# Patient Record
Sex: FEMALE | Race: WHITE | NOT HISPANIC OR LATINO | Employment: OTHER | URBAN - METROPOLITAN AREA
[De-identification: names, ages, dates, MRNs, and addresses within clinical notes are randomized per-mention and may not be internally consistent; named-entity substitution may affect disease eponyms.]

---

## 2023-04-24 DIAGNOSIS — G25.0 ESSENTIAL TREMOR: Primary | ICD-10-CM

## 2023-04-24 RX ORDER — PRIMIDONE 50 MG/1
50 TABLET ORAL 3 TIMES DAILY
Qty: 30 TABLET | Refills: 2 | Status: SHIPPED | OUTPATIENT
Start: 2023-04-24 | End: 2023-05-19 | Stop reason: SDUPTHER

## 2023-04-24 RX ORDER — PRIMIDONE 50 MG/1
50 TABLET ORAL 3 TIMES DAILY
COMMUNITY
Start: 2020-01-21 | End: 2023-04-24 | Stop reason: SDUPTHER

## 2023-05-16 LAB
ALANINE AMINOTRANSFERASE (SGPT) (U/L) IN SER/PLAS: 19 U/L (ref 7–45)
ALBUMIN (G/DL) IN SER/PLAS: 4.2 G/DL (ref 3.4–5)
ALKALINE PHOSPHATASE (U/L) IN SER/PLAS: 74 U/L (ref 33–136)
ANION GAP IN SER/PLAS: 10 MMOL/L (ref 10–20)
ASPARTATE AMINOTRANSFERASE (SGOT) (U/L) IN SER/PLAS: 17 U/L (ref 9–39)
BILIRUBIN TOTAL (MG/DL) IN SER/PLAS: 0.4 MG/DL (ref 0–1.2)
CALCIUM (MG/DL) IN SER/PLAS: 9.6 MG/DL (ref 8.6–10.3)
CARBON DIOXIDE, TOTAL (MMOL/L) IN SER/PLAS: 31 MMOL/L (ref 21–32)
CHLORIDE (MMOL/L) IN SER/PLAS: 102 MMOL/L (ref 98–107)
CHOLESTEROL (MG/DL) IN SER/PLAS: 175 MG/DL (ref 0–199)
CHOLESTEROL IN HDL (MG/DL) IN SER/PLAS: 72.9 MG/DL
CHOLESTEROL/HDL RATIO: 2.4
COBALAMIN (VITAMIN B12) (PG/ML) IN SER/PLAS: 993 PG/ML (ref 211–911)
CREATININE (MG/DL) IN SER/PLAS: 0.89 MG/DL (ref 0.5–1.05)
ERYTHROCYTE DISTRIBUTION WIDTH (RATIO) BY AUTOMATED COUNT: 13.5 % (ref 11.5–14.5)
ERYTHROCYTE MEAN CORPUSCULAR HEMOGLOBIN CONCENTRATION (G/DL) BY AUTOMATED: 34.5 G/DL (ref 32–36)
ERYTHROCYTE MEAN CORPUSCULAR VOLUME (FL) BY AUTOMATED COUNT: 96 FL (ref 80–100)
ERYTHROCYTES (10*6/UL) IN BLOOD BY AUTOMATED COUNT: 4.86 X10E12/L (ref 4–5.2)
GFR FEMALE: 67 ML/MIN/1.73M2
GLUCOSE (MG/DL) IN SER/PLAS: 121 MG/DL (ref 74–99)
HEMATOCRIT (%) IN BLOOD BY AUTOMATED COUNT: 46.7 % (ref 36–46)
HEMOGLOBIN (G/DL) IN BLOOD: 16.1 G/DL (ref 12–16)
LDL: 92 MG/DL (ref 0–99)
LEUKOCYTES (10*3/UL) IN BLOOD BY AUTOMATED COUNT: 5.1 X10E9/L (ref 4.4–11.3)
PLATELETS (10*3/UL) IN BLOOD AUTOMATED COUNT: 247 X10E9/L (ref 150–450)
POTASSIUM (MMOL/L) IN SER/PLAS: 4 MMOL/L (ref 3.5–5.3)
PROTEIN TOTAL: 7.2 G/DL (ref 6.4–8.2)
SODIUM (MMOL/L) IN SER/PLAS: 139 MMOL/L (ref 136–145)
THYROTROPIN (MIU/L) IN SER/PLAS BY DETECTION LIMIT <= 0.05 MIU/L: 1.1 MIU/L (ref 0.44–3.98)
TRIGLYCERIDE (MG/DL) IN SER/PLAS: 52 MG/DL (ref 0–149)
UREA NITROGEN (MG/DL) IN SER/PLAS: 23 MG/DL (ref 6–23)
VLDL: 10 MG/DL (ref 0–40)

## 2023-05-17 LAB
ESTIMATED AVERAGE GLUCOSE FOR HBA1C: 120 MG/DL
HEMOGLOBIN A1C/HEMOGLOBIN TOTAL IN BLOOD: 5.8 %

## 2023-05-18 ENCOUNTER — TELEPHONE (OUTPATIENT)
Dept: PRIMARY CARE | Facility: CLINIC | Age: 78
End: 2023-05-18
Payer: MEDICARE

## 2023-05-18 NOTE — TELEPHONE ENCOUNTER
Patient notified. Patient states the thyroid medication is making her sick. States Saturday tongue swelled, she called and spoke with Giovani and he told her not to take it. Patient took it again and felt really sick. She has not had her thyroid medication since Monday. Does not want to take it and is not sure what to do. Patient said she knows it the medication as she trailed off of it and has not had any issues

## 2023-05-19 DIAGNOSIS — G25.0 ESSENTIAL TREMOR: ICD-10-CM

## 2023-05-19 RX ORDER — PRIMIDONE 50 MG/1
50 TABLET ORAL 3 TIMES DAILY
Qty: 270 TABLET | Refills: 1 | Status: SHIPPED | OUTPATIENT
Start: 2023-05-19 | End: 2024-02-13 | Stop reason: SDUPTHER

## 2023-06-01 PROBLEM — J18.9 PNEUMONIA: Status: ACTIVE | Noted: 2023-06-01

## 2023-06-01 PROBLEM — G47.33 OSA ON CPAP: Status: ACTIVE | Noted: 2023-06-01

## 2023-06-01 PROBLEM — J45.909 REACTIVE AIRWAY DISEASE (HHS-HCC): Status: ACTIVE | Noted: 2023-06-01

## 2023-06-01 PROBLEM — R73.03 PREDIABETES: Status: ACTIVE | Noted: 2023-06-01

## 2023-06-01 PROBLEM — M79.7 FIBROMYALGIA: Status: ACTIVE | Noted: 2023-06-01

## 2023-06-01 PROBLEM — G25.0 ESSENTIAL TREMOR: Status: ACTIVE | Noted: 2023-06-01

## 2023-06-01 PROBLEM — I10 HTN (HYPERTENSION): Status: ACTIVE | Noted: 2023-06-01

## 2023-06-01 PROBLEM — E03.9 HYPOTHYROIDISM: Status: ACTIVE | Noted: 2023-06-01

## 2023-06-01 PROBLEM — F32.5 MAJOR DEPRESSION IN REMISSION (CMS-HCC): Status: ACTIVE | Noted: 2023-06-01

## 2023-06-01 PROBLEM — R53.1 GENERALIZED WEAKNESS: Status: ACTIVE | Noted: 2023-06-01

## 2023-06-01 PROBLEM — R79.89 LOW VITAMIN D LEVEL: Status: ACTIVE | Noted: 2023-06-01

## 2023-06-01 RX ORDER — LANOLIN ALCOHOL/MO/W.PET/CERES
1 CREAM (GRAM) TOPICAL DAILY
COMMUNITY

## 2023-06-01 RX ORDER — ASCORBIC ACID 500 MG
500 TABLET ORAL DAILY
COMMUNITY

## 2023-06-01 RX ORDER — FUROSEMIDE 20 MG/1
1 TABLET ORAL DAILY
COMMUNITY
Start: 2022-11-23 | End: 2023-12-04 | Stop reason: SINTOL

## 2023-06-01 RX ORDER — BUPROPION HYDROCHLORIDE 300 MG/1
300 TABLET ORAL DAILY
COMMUNITY
End: 2023-07-07 | Stop reason: SDUPTHER

## 2023-06-01 RX ORDER — LEVOTHYROXINE SODIUM 125 UG/1
125 TABLET ORAL DAILY
COMMUNITY
End: 2023-06-02

## 2023-06-01 RX ORDER — ERGOCALCIFEROL (VITAMIN D2) 50 MCG
50 CAPSULE ORAL DAILY
COMMUNITY
Start: 2020-02-24

## 2023-06-01 RX ORDER — MULTIVITAMIN
1 TABLET ORAL DAILY
COMMUNITY

## 2023-06-02 ENCOUNTER — OFFICE VISIT (OUTPATIENT)
Dept: PRIMARY CARE | Facility: CLINIC | Age: 78
End: 2023-06-02
Payer: MEDICARE

## 2023-06-02 VITALS
WEIGHT: 210 LBS | HEART RATE: 80 BPM | SYSTOLIC BLOOD PRESSURE: 130 MMHG | HEIGHT: 65 IN | BODY MASS INDEX: 34.99 KG/M2 | DIASTOLIC BLOOD PRESSURE: 80 MMHG

## 2023-06-02 DIAGNOSIS — Z00.00 MEDICARE ANNUAL WELLNESS VISIT, SUBSEQUENT: ICD-10-CM

## 2023-06-02 DIAGNOSIS — R73.03 PREDIABETES: ICD-10-CM

## 2023-06-02 DIAGNOSIS — M54.2 NECK PAIN: ICD-10-CM

## 2023-06-02 DIAGNOSIS — G47.33 OSA ON CPAP: ICD-10-CM

## 2023-06-02 DIAGNOSIS — J45.909 REACTIVE AIRWAY DISEASE WITHOUT COMPLICATION, UNSPECIFIED ASTHMA SEVERITY, UNSPECIFIED WHETHER PERSISTENT (HHS-HCC): ICD-10-CM

## 2023-06-02 DIAGNOSIS — R73.9 HYPERGLYCEMIA: ICD-10-CM

## 2023-06-02 DIAGNOSIS — G25.0 ESSENTIAL TREMOR: ICD-10-CM

## 2023-06-02 DIAGNOSIS — R79.89 LOW VITAMIN D LEVEL: ICD-10-CM

## 2023-06-02 DIAGNOSIS — E55.9 VITAMIN D DEFICIENCY: ICD-10-CM

## 2023-06-02 DIAGNOSIS — F32.5 MAJOR DEPRESSION IN REMISSION (CMS-HCC): ICD-10-CM

## 2023-06-02 DIAGNOSIS — I10 PRIMARY HYPERTENSION: Primary | ICD-10-CM

## 2023-06-02 DIAGNOSIS — E03.9 ACQUIRED HYPOTHYROIDISM: ICD-10-CM

## 2023-06-02 PROCEDURE — 3079F DIAST BP 80-89 MM HG: CPT | Performed by: CLINICAL NURSE SPECIALIST

## 2023-06-02 PROCEDURE — 3075F SYST BP GE 130 - 139MM HG: CPT | Performed by: CLINICAL NURSE SPECIALIST

## 2023-06-02 PROCEDURE — 1036F TOBACCO NON-USER: CPT | Performed by: CLINICAL NURSE SPECIALIST

## 2023-06-02 PROCEDURE — 1159F MED LIST DOCD IN RCRD: CPT | Performed by: CLINICAL NURSE SPECIALIST

## 2023-06-02 PROCEDURE — G0439 PPPS, SUBSEQ VISIT: HCPCS | Performed by: CLINICAL NURSE SPECIALIST

## 2023-06-02 PROCEDURE — G0444 DEPRESSION SCREEN ANNUAL: HCPCS | Performed by: CLINICAL NURSE SPECIALIST

## 2023-06-02 PROCEDURE — 99214 OFFICE O/P EST MOD 30 MIN: CPT | Performed by: CLINICAL NURSE SPECIALIST

## 2023-06-02 PROCEDURE — 1170F FXNL STATUS ASSESSED: CPT | Performed by: CLINICAL NURSE SPECIALIST

## 2023-06-02 PROCEDURE — 1160F RVW MEDS BY RX/DR IN RCRD: CPT | Performed by: CLINICAL NURSE SPECIALIST

## 2023-06-02 RX ORDER — BLOOD-GLUCOSE METER
1 KIT MISCELLANEOUS DAILY
Qty: 100 STRIP | Refills: 11 | Status: SHIPPED | OUTPATIENT
Start: 2023-06-02 | End: 2023-06-05 | Stop reason: SDUPTHER

## 2023-06-02 RX ORDER — LANCETS 28 GAUGE
EACH MISCELLANEOUS
Qty: 100 EACH | Refills: 11 | Status: SHIPPED | OUTPATIENT
Start: 2023-06-02 | End: 2023-06-05 | Stop reason: SDUPTHER

## 2023-06-02 RX ORDER — LEVOTHYROXINE SODIUM 125 UG/1
125 TABLET ORAL DAILY
Qty: 30 TABLET | Refills: 11 | Status: SHIPPED | OUTPATIENT
Start: 2023-06-02 | End: 2023-07-07 | Stop reason: SDUPTHER

## 2023-06-02 RX ORDER — INSULIN PUMP SYRINGE, 3 ML
1 EACH MISCELLANEOUS DAILY
Qty: 1 EACH | Refills: 0 | Status: SHIPPED | OUTPATIENT
Start: 2023-06-02 | End: 2023-06-05 | Stop reason: SDUPTHER

## 2023-06-02 ASSESSMENT — ENCOUNTER SYMPTOMS
APPETITE CHANGE: 0
NECK PAIN: 0
SLEEP DISTURBANCE: 0
HEMATURIA: 0
WOUND: 0
ACTIVITY CHANGE: 0
SHORTNESS OF BREATH: 0
LOSS OF SENSATION IN FEET: 0
CHILLS: 0
CONSTIPATION: 0
FATIGUE: 0
ABDOMINAL PAIN: 0
PHOTOPHOBIA: 0
BLOOD IN STOOL: 0
NAUSEA: 0
DEPRESSION: 1
BACK PAIN: 1
CONFUSION: 0
SEIZURES: 0
DYSURIA: 0
EYE PAIN: 0
OCCASIONAL FEELINGS OF UNSTEADINESS: 0
WHEEZING: 0
POLYDIPSIA: 0
SORE THROAT: 0
MYALGIAS: 0
ARTHRALGIAS: 1
JOINT SWELLING: 0
DIZZINESS: 0
VOMITING: 0
BRUISES/BLEEDS EASILY: 0
UNEXPECTED WEIGHT CHANGE: 0
PALPITATIONS: 0
FEVER: 0
HEADACHES: 0
COUGH: 0
DIARRHEA: 0
CHEST TIGHTNESS: 0
FLANK PAIN: 0
TROUBLE SWALLOWING: 0

## 2023-06-02 ASSESSMENT — PATIENT HEALTH QUESTIONNAIRE - PHQ9
SUM OF ALL RESPONSES TO PHQ9 QUESTIONS 1 AND 2: 1
1. LITTLE INTEREST OR PLEASURE IN DOING THINGS: NOT AT ALL
2. FEELING DOWN, DEPRESSED OR HOPELESS: SEVERAL DAYS

## 2023-06-02 ASSESSMENT — ACTIVITIES OF DAILY LIVING (ADL)
GROCERY_SHOPPING: INDEPENDENT
TAKING_MEDICATION: INDEPENDENT
DOING_HOUSEWORK: INDEPENDENT
BATHING: INDEPENDENT
DRESSING: INDEPENDENT
MANAGING_FINANCES: INDEPENDENT

## 2023-06-02 ASSESSMENT — COLUMBIA-SUICIDE SEVERITY RATING SCALE - C-SSRS
2. HAVE YOU ACTUALLY HAD ANY THOUGHTS OF KILLING YOURSELF?: NO
1. IN THE PAST MONTH, HAVE YOU WISHED YOU WERE DEAD OR WISHED YOU COULD GO TO SLEEP AND NOT WAKE UP?: NO
6. HAVE YOU EVER DONE ANYTHING, STARTED TO DO ANYTHING, OR PREPARED TO DO ANYTHING TO END YOUR LIFE?: NO

## 2023-06-02 NOTE — PROGRESS NOTES
Subjective   Reason for Visit: Liane Gates is an 77 y.o. female here for a Medicare Wellness visit.     Past Medical, Surgical, and Family History reviewed and updated in chart.    Reviewed all medications by prescribing practitioner or clinical pharmacist (such as prescriptions, OTCs, herbal therapies and supplements) and documented in the medical record.    HPI    Here today as a follow up appointment. Due for Medicare Wellness.     Diagnosed with COVID in December 2021. Struggling with continued complaints of symptoms, some improvement.     She does have a history of reactive airway disease and has had recurrent bronchospasm after pulmonary infections in the past. Previously responded well to steroid inhaler, she has stopped taking it daily secondary to cost. She does have one on hand in case she gets an illness currently not having any shortness of breath or wheezing.     She does have history of depression but notes that symptoms have been well controlled with Wellbutrin. Essential tremor symptoms have been controlled with medication.She does not feel that she needs to increase or change medication.     Essential hypertension. Does not tolerate Spironolactone. Developed a rash on medication. Stopped medication and symptoms have started to resolve. Multiple medication allergies for blood pressure medications. Previously was on Hydrochlorothiazide, no longer tolerated and again stopped medication. Has been wearing her compression stockings.     Trouble tolerating her Levothyroxine. Developed side effects. Called into the office and was recommended to stop medication from on call. Attempted to restart and symptoms returned.     Complaints of increased neck pain and discomfort. Has been doing at home exercises to try and find relief.        Patient Care Team:  ROBERT Ba as PCP - General (Internal Medicine)  ROBERT Ba as PCP - [facility] Attributed Provider     Review of Systems  "  Constitutional:  Negative for activity change, appetite change, chills, fatigue, fever and unexpected weight change.   HENT:  Negative for ear pain, hearing loss, nosebleeds, sore throat, tinnitus and trouble swallowing.    Eyes:  Negative for photophobia, pain and visual disturbance.   Respiratory:  Negative for cough, chest tightness, shortness of breath and wheezing.    Cardiovascular:  Negative for chest pain, palpitations and leg swelling.   Gastrointestinal:  Negative for abdominal pain, blood in stool, constipation, diarrhea, nausea and vomiting.   Endocrine: Negative for cold intolerance, heat intolerance, polydipsia and polyuria.   Genitourinary:  Negative for dysuria, flank pain and hematuria.   Musculoskeletal:  Positive for arthralgias and back pain. Negative for joint swelling, myalgias and neck pain.   Skin:  Negative for pallor, rash and wound.   Allergic/Immunologic: Negative for immunocompromised state.   Neurological:  Negative for dizziness, seizures and headaches.   Hematological:  Does not bruise/bleed easily.   Psychiatric/Behavioral:  Negative for confusion and sleep disturbance.        Objective   Vitals:  /90 (BP Location: Right arm, Patient Position: Sitting)   Pulse 80   Ht 1.638 m (5' 4.5\")   Wt 95.3 kg (210 lb)   BMI 35.49 kg/m²       Physical Exam  Vitals and nursing note reviewed.   Constitutional:       General: She is not in acute distress.     Appearance: Normal appearance.   HENT:      Head: Normocephalic.      Nose: Nose normal.   Eyes:      Conjunctiva/sclera: Conjunctivae normal.   Neck:      Vascular: No carotid bruit.   Cardiovascular:      Rate and Rhythm: Normal rate and regular rhythm.      Pulses: Normal pulses.      Heart sounds: Normal heart sounds.   Pulmonary:      Effort: Pulmonary effort is normal.      Breath sounds: Normal breath sounds.   Abdominal:      General: Bowel sounds are normal.      Palpations: Abdomen is soft.   Musculoskeletal:         " General: Normal range of motion.      Cervical back: Normal range of motion.   Skin:     General: Skin is warm and dry.   Neurological:      Mental Status: She is alert and oriented to person, place, and time. Mental status is at baseline.   Psychiatric:         Mood and Affect: Mood normal.         Behavior: Behavior normal.         Assessment/Plan   Problem List Items Addressed This Visit    None    Reviewed most recent lab work available with patient.     Obstructive sleep apnea. Last CPAP titration study was done June 2019. Patient doing well on CPAP at night with pressure of 11.   Hypothyroidism. Stopped Levothyroxine, did not tolerate. Synthroid. Repeat studies ordered.   Prediabetes. A1C 5.8%. Encouraged to be more consistent with diet. Previously did not tolerate metformin secondary to side effects. We'll continue to monitor hemoglobin A1c in 6 months. Supplies ordered to monitor Glucose.   Reactive airway disease. She has had problems with recurrent bronchospasm after pulmonary infections. Responded previously to Asmanex inhaler but stopped taking daily secondary to cost currently asymptomatic. pulmonary function test in May 2019 was normal.  Obesity: BMI: 35.49. Lifestyle changes recommended: Diet consisting of low fat foods, lean meats, high fiber, fresh fruits and vegetables. 150 min/ weekly aerobic exercise.  Essential tremor. Symptomatically controlled with primidone.  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  Hypertension. Does not tolerate multiple Hypertension medications. Discontinued Spironolactone and Furosemide. Restarted HCTZ, did not tolerate. Screening coronary artery calcium score was 2.4 in May 2019. Follow up with blood pressure readings.   Medicare Wellness: Routine and age appropriate recommendations discussed with the patient today and patient verbalized understanding of the recommendations.  Questions answered.  Age appropriate immunizations and preventative  screenings discussed with the patient and ordered as appropriate. Labs updated and ordered as indicated. Recommend healthy diet and daily exercise to maintain healthy body weight.   Neck Pain: Imaging ordered. Will follow up pending results.     DEXA scan was done May 2019. Scheduled for June 2023.   Mammogram : October 2020. Scheduled for June 2023.   Cologuard was negative November 2020, repeat in 3 years.   Prevnar done November 2017  Pneumovax done in May 2018.   Flu Vaccine: November 2022.   Medicare Wellness: May 2023.  COVID Vaccine: April/June 2021. November 2022.

## 2023-06-05 DIAGNOSIS — R73.9 HYPERGLYCEMIA: ICD-10-CM

## 2023-06-05 DIAGNOSIS — R73.03 PREDIABETES: ICD-10-CM

## 2023-06-05 RX ORDER — LANCETS 28 GAUGE
EACH MISCELLANEOUS
Qty: 100 EACH | Refills: 11 | Status: SHIPPED | OUTPATIENT
Start: 2023-06-05 | End: 2024-02-08 | Stop reason: SDUPTHER

## 2023-06-05 RX ORDER — BLOOD-GLUCOSE METER
1 KIT MISCELLANEOUS DAILY
Qty: 100 STRIP | Refills: 11 | Status: SHIPPED | OUTPATIENT
Start: 2023-06-05 | End: 2024-02-08 | Stop reason: SDUPTHER

## 2023-06-05 RX ORDER — INSULIN PUMP SYRINGE, 3 ML
1 EACH MISCELLANEOUS DAILY
Qty: 1 EACH | Refills: 0 | Status: SHIPPED | OUTPATIENT
Start: 2023-06-05 | End: 2024-02-08 | Stop reason: SDUPTHER

## 2023-06-06 ENCOUNTER — TELEPHONE (OUTPATIENT)
Dept: PRIMARY CARE | Facility: CLINIC | Age: 78
End: 2023-06-06
Payer: MEDICARE

## 2023-06-06 NOTE — TELEPHONE ENCOUNTER
Left on voicemail:  Meter and Lancets not covered Medicare Part D with Dx codes, please resend with different Dx code  [facility]

## 2023-07-07 ENCOUNTER — TELEPHONE (OUTPATIENT)
Dept: PRIMARY CARE | Facility: CLINIC | Age: 78
End: 2023-07-07
Payer: MEDICARE

## 2023-07-07 DIAGNOSIS — F32.5 MAJOR DEPRESSION IN REMISSION (CMS-HCC): ICD-10-CM

## 2023-07-07 DIAGNOSIS — E03.9 ACQUIRED HYPOTHYROIDISM: ICD-10-CM

## 2023-07-07 RX ORDER — BUPROPION HYDROCHLORIDE 300 MG/1
300 TABLET ORAL DAILY
Qty: 90 TABLET | Refills: 0 | Status: SHIPPED | OUTPATIENT
Start: 2023-07-07 | End: 2023-10-02 | Stop reason: SDUPTHER

## 2023-07-07 RX ORDER — LEVOTHYROXINE SODIUM 125 UG/1
125 TABLET ORAL DAILY
Qty: 90 TABLET | Refills: 0 | Status: SHIPPED | OUTPATIENT
Start: 2023-07-07 | End: 2023-07-10

## 2023-07-07 NOTE — TELEPHONE ENCOUNTER
Rx Refill Request Telephone Encounter    Name:  Liane Gates  :  [id number]  Medication Name:  Synthroid  125mcg          Specific Pharmacy location:  [facility]  Rx Refill Request Telephone Encounter    Name:  Liane Gates  :  [id number]  Medication Name:  Bupropion  300mg

## 2023-07-10 ENCOUNTER — TELEPHONE (OUTPATIENT)
Dept: PRIMARY CARE | Facility: CLINIC | Age: 78
End: 2023-07-10
Payer: MEDICARE

## 2023-07-10 DIAGNOSIS — E03.9 ACQUIRED HYPOTHYROIDISM: Primary | ICD-10-CM

## 2023-07-10 RX ORDER — THYROID 30 MG/1
30 TABLET ORAL
Qty: 30 TABLET | Refills: 1 | Status: SHIPPED | OUTPATIENT
Start: 2023-07-10 | End: 2023-12-04 | Stop reason: SINTOL

## 2023-07-10 NOTE — PROGRESS NOTES
Medication E-Prescribed. Please order repeat Thyroid studies in 3 months and make sure follow up appointment scheduled. Thank you!

## 2023-07-10 NOTE — TELEPHONE ENCOUNTER
If still unable to tolerate, would recommend that we have her see Endo to see what other options are available.

## 2023-07-10 NOTE — TELEPHONE ENCOUNTER
Not sure if the other message sent--     E-prescribed. Make sure that she we repeat Thyroid studies in 3 months and has a follow up appointment. Thank you!

## 2023-07-10 NOTE — TELEPHONE ENCOUNTER
Patient was started on Synthroid due to an allergy from Levothyroxine. Patient stated mid week last week she had a swollen lip and had to take Benadryl and had an episode of a rapid heart beat that lasted 2-3 min. Then over the weekend she had a huge blotch on her forearm that was red, swollen and warm to touch. She took Benadryl for that as well and it has subsided.

## 2023-08-21 ENCOUNTER — TELEPHONE (OUTPATIENT)
Dept: PRIMARY CARE | Facility: CLINIC | Age: 78
End: 2023-08-21
Payer: MEDICARE

## 2023-08-21 DIAGNOSIS — E03.9 ACQUIRED HYPOTHYROIDISM: ICD-10-CM

## 2023-08-21 NOTE — TELEPHONE ENCOUNTER
Patient stopped taking the [place] thyroid meds. Was having side effects:redness and swelling in palm and warm to touch,blurred vision,swelling in legs, bad heat intolerance. Would like to try another medication. [facility]

## 2023-08-21 NOTE — TELEPHONE ENCOUNTER
We have tried three different medications, Levothyroxine, Synthroid, and now [place] Thyroid. If she does not tolerate any of these medications we need to have her see Endocrinology. Please place a referral. Thank you!

## 2023-08-22 ENCOUNTER — TELEPHONE (OUTPATIENT)
Dept: PRIMARY CARE | Facility: CLINIC | Age: 78
End: 2023-08-22
Payer: MEDICARE

## 2023-08-22 NOTE — TELEPHONE ENCOUNTER
Patient needs the referral for endocrinology, last office note and blood work faxed to [facility] at [phone]. She couldn't get into Dr. Sainz until February of next year

## 2023-09-05 ENCOUNTER — TELEPHONE (OUTPATIENT)
Dept: PRIMARY CARE | Facility: CLINIC | Age: 78
End: 2023-09-05
Payer: MEDICARE

## 2023-09-05 NOTE — TELEPHONE ENCOUNTER
Patient called for mammogram results and cervical spine   Patient  will see Endocrinologist in February and wondering how her thyroid can be monitored until then does she need blood work ?etc.  Please call [phone]

## 2023-09-05 NOTE — TELEPHONE ENCOUNTER
Please let patient know that Mammogram was negative.   XR imaging indicates chronic degenerative changes, consistent with arthritis.   We can monitor her Thyroid but she has not tolerated any medications that we have tried. Another option would be to check with any other Endocrinologist but may need to drive further. There is already an updated Thyroid panel in the system to be evaluated.

## 2023-10-02 ENCOUNTER — TELEPHONE (OUTPATIENT)
Dept: PRIMARY CARE | Facility: CLINIC | Age: 78
End: 2023-10-02
Payer: MEDICARE

## 2023-10-02 DIAGNOSIS — F32.5 MAJOR DEPRESSION IN REMISSION (CMS-HCC): ICD-10-CM

## 2023-10-02 RX ORDER — BUPROPION HYDROCHLORIDE 300 MG/1
300 TABLET ORAL DAILY
Qty: 90 TABLET | Refills: 0 | Status: SHIPPED | OUTPATIENT
Start: 2023-10-02 | End: 2024-01-04 | Stop reason: SDUPTHER

## 2023-10-04 ENCOUNTER — TELEPHONE (OUTPATIENT)
Dept: PRIMARY CARE | Facility: CLINIC | Age: 78
End: 2023-10-04
Payer: MEDICARE

## 2023-10-04 NOTE — TELEPHONE ENCOUNTER
Potentially. Can repeat her Thyroid panel and see if level is worse. Otherwise, should be seen for an acute to see if there are other concerns.

## 2023-10-04 NOTE — TELEPHONE ENCOUNTER
She has been off thyroid medication for 6 weeks    She has had blurred vision for past week & her tremors are worse    Is this from being off thyroid medications ?

## 2023-10-10 ENCOUNTER — APPOINTMENT (OUTPATIENT)
Dept: RADIOLOGY | Facility: CLINIC | Age: 78
End: 2023-10-10
Payer: MEDICARE

## 2023-10-25 ENCOUNTER — TELEPHONE (OUTPATIENT)
Dept: PRIMARY CARE | Facility: CLINIC | Age: 78
End: 2023-10-25
Payer: MEDICARE

## 2023-10-25 DIAGNOSIS — R73.03 PREDIABETES: Primary | ICD-10-CM

## 2023-10-25 NOTE — TELEPHONE ENCOUNTER
Patient is going tomorrow to have her TSH checked she is asking if she can have a fasting blood work to check her sugars? She stated she hasn't been feeling well and she's been having issues with her vision being blurred. She is scheduled to see her eye doctor soon.

## 2023-10-30 ENCOUNTER — TELEPHONE (OUTPATIENT)
Dept: PRIMARY CARE | Facility: CLINIC | Age: 78
End: 2023-10-30
Payer: MEDICARE

## 2023-10-30 ENCOUNTER — LAB (OUTPATIENT)
Dept: LAB | Facility: LAB | Age: 78
End: 2023-10-30
Payer: MEDICARE

## 2023-10-30 DIAGNOSIS — Z78.0 POSTMENOPAUSAL: Primary | ICD-10-CM

## 2023-10-30 DIAGNOSIS — F32.5 MAJOR DEPRESSION IN REMISSION (CMS-HCC): ICD-10-CM

## 2023-10-30 DIAGNOSIS — R79.89 LOW VITAMIN D LEVEL: ICD-10-CM

## 2023-10-30 DIAGNOSIS — E55.9 VITAMIN D DEFICIENCY: ICD-10-CM

## 2023-10-30 DIAGNOSIS — G47.33 OSA ON CPAP: ICD-10-CM

## 2023-10-30 DIAGNOSIS — G25.0 ESSENTIAL TREMOR: ICD-10-CM

## 2023-10-30 DIAGNOSIS — J45.909 REACTIVE AIRWAY DISEASE WITHOUT COMPLICATION, UNSPECIFIED ASTHMA SEVERITY, UNSPECIFIED WHETHER PERSISTENT (HHS-HCC): ICD-10-CM

## 2023-10-30 DIAGNOSIS — E03.9 ACQUIRED HYPOTHYROIDISM: ICD-10-CM

## 2023-10-30 DIAGNOSIS — R73.03 PREDIABETES: ICD-10-CM

## 2023-10-30 DIAGNOSIS — I10 PRIMARY HYPERTENSION: ICD-10-CM

## 2023-10-30 DIAGNOSIS — Z00.00 MEDICARE ANNUAL WELLNESS VISIT, SUBSEQUENT: ICD-10-CM

## 2023-10-30 LAB
25(OH)D3 SERPL-MCNC: 78 NG/ML (ref 30–100)
ALBUMIN SERPL BCP-MCNC: 4.1 G/DL (ref 3.4–5)
ALP SERPL-CCNC: 62 U/L (ref 33–136)
ALT SERPL W P-5'-P-CCNC: 19 U/L (ref 7–45)
ANION GAP SERPL CALC-SCNC: 10 MMOL/L (ref 10–20)
AST SERPL W P-5'-P-CCNC: 18 U/L (ref 9–39)
BILIRUB SERPL-MCNC: 0.5 MG/DL (ref 0–1.2)
BUN SERPL-MCNC: 22 MG/DL (ref 6–23)
CALCIUM SERPL-MCNC: 9.5 MG/DL (ref 8.6–10.3)
CHLORIDE SERPL-SCNC: 103 MMOL/L (ref 98–107)
CO2 SERPL-SCNC: 32 MMOL/L (ref 21–32)
CREAT SERPL-MCNC: 0.85 MG/DL (ref 0.5–1.05)
GFR SERPL CREATININE-BSD FRML MDRD: 71 ML/MIN/1.73M*2
GLUCOSE SERPL-MCNC: 122 MG/DL (ref 74–99)
POTASSIUM SERPL-SCNC: 4.2 MMOL/L (ref 3.5–5.3)
PROT SERPL-MCNC: 6.3 G/DL (ref 6.4–8.2)
SODIUM SERPL-SCNC: 141 MMOL/L (ref 136–145)
T4 FREE SERPL-MCNC: 0.75 NG/DL (ref 0.61–1.12)
TSH SERPL-ACNC: 4.42 MIU/L (ref 0.44–3.98)

## 2023-10-30 PROCEDURE — 36415 COLL VENOUS BLD VENIPUNCTURE: CPT

## 2023-10-30 PROCEDURE — 80053 COMPREHEN METABOLIC PANEL: CPT

## 2023-10-30 PROCEDURE — 84443 ASSAY THYROID STIM HORMONE: CPT

## 2023-10-30 PROCEDURE — 82306 VITAMIN D 25 HYDROXY: CPT

## 2023-10-30 PROCEDURE — 83036 HEMOGLOBIN GLYCOSYLATED A1C: CPT

## 2023-10-30 PROCEDURE — 84439 ASSAY OF FREE THYROXINE: CPT

## 2023-10-30 NOTE — TELEPHONE ENCOUNTER
Scheduled for bone density on 11/2    Order is system is for extra views which cannot be done in [place].  Is order supposed to be for the normal bone density  ?      Alex ([facility]) [phone]

## 2023-10-31 LAB
EST. AVERAGE GLUCOSE BLD GHB EST-MCNC: 123 MG/DL
HBA1C MFR BLD: 5.9 %

## 2023-11-01 ENCOUNTER — TELEPHONE (OUTPATIENT)
Dept: PRIMARY CARE | Facility: CLINIC | Age: 78
End: 2023-11-01
Payer: MEDICARE

## 2023-11-01 NOTE — TELEPHONE ENCOUNTER
Symptoms include tiredness, depression, cold intolerances, weight gain, constipation, muscle cramps/weakness.

## 2023-11-01 NOTE — TELEPHONE ENCOUNTER
Patient notified. Patient states they have her on a high priority list to be seen sooner. Patient did call around and was able to be seen in [place] but want to stay in with  and does not want to drive far.  Patient wants to know what signs she should look for. States she just feels crappy all the way around

## 2023-11-01 NOTE — TELEPHONE ENCOUNTER
----- Message from MACK Ba-CNS sent at 10/31/2023  9:33 PM EDT -----  Please call patient with lab results. Lab work is stable except that her Thyroid is worse again. Has not been able to tolerate any of the medications for her Thyroid that we have prescribed. Does have a follow up with Endo, not until February. Can check with her Insurance any other Endo that might be able to see her sooner.

## 2023-11-02 ENCOUNTER — APPOINTMENT (OUTPATIENT)
Dept: RADIOLOGY | Facility: CLINIC | Age: 78
End: 2023-11-02
Payer: MEDICARE

## 2023-11-22 ENCOUNTER — APPOINTMENT (OUTPATIENT)
Dept: RADIOLOGY | Facility: CLINIC | Age: 78
End: 2023-11-22
Payer: MEDICARE

## 2023-11-30 ENCOUNTER — LAB (OUTPATIENT)
Dept: LAB | Facility: LAB | Age: 78
End: 2023-11-30
Payer: MEDICARE

## 2023-11-30 DIAGNOSIS — E03.9 ACQUIRED HYPOTHYROIDISM: ICD-10-CM

## 2023-11-30 DIAGNOSIS — I10 PRIMARY HYPERTENSION: ICD-10-CM

## 2023-11-30 DIAGNOSIS — G47.33 OSA ON CPAP: ICD-10-CM

## 2023-11-30 DIAGNOSIS — J45.909 REACTIVE AIRWAY DISEASE WITHOUT COMPLICATION, UNSPECIFIED ASTHMA SEVERITY, UNSPECIFIED WHETHER PERSISTENT (HHS-HCC): ICD-10-CM

## 2023-11-30 DIAGNOSIS — F32.5 MAJOR DEPRESSION IN REMISSION (CMS-HCC): ICD-10-CM

## 2023-11-30 DIAGNOSIS — R73.03 PREDIABETES: ICD-10-CM

## 2023-11-30 DIAGNOSIS — Z00.00 MEDICARE ANNUAL WELLNESS VISIT, SUBSEQUENT: ICD-10-CM

## 2023-11-30 DIAGNOSIS — G25.0 ESSENTIAL TREMOR: ICD-10-CM

## 2023-11-30 LAB
ALBUMIN SERPL BCP-MCNC: 4.4 G/DL (ref 3.4–5)
ALP SERPL-CCNC: 70 U/L (ref 33–136)
ALT SERPL W P-5'-P-CCNC: 18 U/L (ref 7–45)
ANION GAP SERPL CALC-SCNC: 10 MMOL/L (ref 10–20)
AST SERPL W P-5'-P-CCNC: 17 U/L (ref 9–39)
BILIRUB SERPL-MCNC: 0.5 MG/DL (ref 0–1.2)
BUN SERPL-MCNC: 26 MG/DL (ref 6–23)
CALCIUM SERPL-MCNC: 9.8 MG/DL (ref 8.6–10.3)
CHLORIDE SERPL-SCNC: 101 MMOL/L (ref 98–107)
CHOLEST SERPL-MCNC: 194 MG/DL (ref 0–199)
CHOLESTEROL/HDL RATIO: 2.3
CO2 SERPL-SCNC: 33 MMOL/L (ref 21–32)
CREAT SERPL-MCNC: 0.85 MG/DL (ref 0.5–1.05)
ERYTHROCYTE [DISTWIDTH] IN BLOOD BY AUTOMATED COUNT: 13.8 % (ref 11.5–14.5)
GFR SERPL CREATININE-BSD FRML MDRD: 71 ML/MIN/1.73M*2
GLUCOSE SERPL-MCNC: 119 MG/DL (ref 74–99)
HCT VFR BLD AUTO: 47 % (ref 36–46)
HDLC SERPL-MCNC: 83.7 MG/DL
HGB BLD-MCNC: 15.6 G/DL (ref 12–16)
LDLC SERPL CALC-MCNC: 101 MG/DL
MCH RBC QN AUTO: 32.6 PG (ref 26–34)
MCHC RBC AUTO-ENTMCNC: 33.2 G/DL (ref 32–36)
MCV RBC AUTO: 98 FL (ref 80–100)
NON HDL CHOLESTEROL: 110 MG/DL (ref 0–149)
NRBC BLD-RTO: 0 /100 WBCS (ref 0–0)
PLATELET # BLD AUTO: 225 X10*3/UL (ref 150–450)
POTASSIUM SERPL-SCNC: 4.3 MMOL/L (ref 3.5–5.3)
PROT SERPL-MCNC: 7.2 G/DL (ref 6.4–8.2)
RBC # BLD AUTO: 4.79 X10*6/UL (ref 4–5.2)
SODIUM SERPL-SCNC: 140 MMOL/L (ref 136–145)
TRIGL SERPL-MCNC: 49 MG/DL (ref 0–149)
TSH SERPL-ACNC: 3.97 MIU/L (ref 0.44–3.98)
VIT B12 SERPL-MCNC: 929 PG/ML (ref 211–911)
VLDL: 10 MG/DL (ref 0–40)
WBC # BLD AUTO: 6.1 X10*3/UL (ref 4.4–11.3)

## 2023-11-30 PROCEDURE — 82607 VITAMIN B-12: CPT

## 2023-11-30 PROCEDURE — 83036 HEMOGLOBIN GLYCOSYLATED A1C: CPT

## 2023-11-30 PROCEDURE — 85027 COMPLETE CBC AUTOMATED: CPT

## 2023-11-30 PROCEDURE — 80053 COMPREHEN METABOLIC PANEL: CPT

## 2023-11-30 PROCEDURE — 84443 ASSAY THYROID STIM HORMONE: CPT

## 2023-11-30 PROCEDURE — 36415 COLL VENOUS BLD VENIPUNCTURE: CPT

## 2023-11-30 PROCEDURE — 80061 LIPID PANEL: CPT

## 2023-12-01 LAB
EST. AVERAGE GLUCOSE BLD GHB EST-MCNC: 131 MG/DL
HBA1C MFR BLD: 6.2 %

## 2023-12-04 ENCOUNTER — OFFICE VISIT (OUTPATIENT)
Dept: PRIMARY CARE | Facility: CLINIC | Age: 78
End: 2023-12-04
Payer: MEDICARE

## 2023-12-04 VITALS
WEIGHT: 205 LBS | SYSTOLIC BLOOD PRESSURE: 132 MMHG | HEIGHT: 65 IN | HEART RATE: 68 BPM | BODY MASS INDEX: 34.16 KG/M2 | DIASTOLIC BLOOD PRESSURE: 80 MMHG

## 2023-12-04 DIAGNOSIS — G25.0 ESSENTIAL TREMOR: ICD-10-CM

## 2023-12-04 DIAGNOSIS — I10 PRIMARY HYPERTENSION: ICD-10-CM

## 2023-12-04 DIAGNOSIS — M19.90 OSTEOARTHRITIS, UNSPECIFIED OSTEOARTHRITIS TYPE, UNSPECIFIED SITE: Primary | ICD-10-CM

## 2023-12-04 DIAGNOSIS — Z12.11 COLON CANCER SCREENING: ICD-10-CM

## 2023-12-04 DIAGNOSIS — R73.03 PREDIABETES: ICD-10-CM

## 2023-12-04 DIAGNOSIS — R79.89 LOW VITAMIN D LEVEL: ICD-10-CM

## 2023-12-04 DIAGNOSIS — J45.909 REACTIVE AIRWAY DISEASE WITHOUT COMPLICATION, UNSPECIFIED ASTHMA SEVERITY, UNSPECIFIED WHETHER PERSISTENT (HHS-HCC): ICD-10-CM

## 2023-12-04 DIAGNOSIS — G47.33 OSA ON CPAP: ICD-10-CM

## 2023-12-04 DIAGNOSIS — E03.9 ACQUIRED HYPOTHYROIDISM: ICD-10-CM

## 2023-12-04 DIAGNOSIS — F32.5 MAJOR DEPRESSION IN REMISSION (CMS-HCC): ICD-10-CM

## 2023-12-04 DIAGNOSIS — E55.9 VITAMIN D DEFICIENCY: ICD-10-CM

## 2023-12-04 DIAGNOSIS — G31.84 MCI (MILD COGNITIVE IMPAIRMENT): ICD-10-CM

## 2023-12-04 PROCEDURE — 1036F TOBACCO NON-USER: CPT | Performed by: CLINICAL NURSE SPECIALIST

## 2023-12-04 PROCEDURE — G0008 ADMIN INFLUENZA VIRUS VAC: HCPCS | Performed by: CLINICAL NURSE SPECIALIST

## 2023-12-04 PROCEDURE — 1160F RVW MEDS BY RX/DR IN RCRD: CPT | Performed by: CLINICAL NURSE SPECIALIST

## 2023-12-04 PROCEDURE — 90662 IIV NO PRSV INCREASED AG IM: CPT | Performed by: CLINICAL NURSE SPECIALIST

## 2023-12-04 PROCEDURE — 3079F DIAST BP 80-89 MM HG: CPT | Performed by: CLINICAL NURSE SPECIALIST

## 2023-12-04 PROCEDURE — 99214 OFFICE O/P EST MOD 30 MIN: CPT | Performed by: CLINICAL NURSE SPECIALIST

## 2023-12-04 PROCEDURE — 3075F SYST BP GE 130 - 139MM HG: CPT | Performed by: CLINICAL NURSE SPECIALIST

## 2023-12-04 PROCEDURE — 1159F MED LIST DOCD IN RCRD: CPT | Performed by: CLINICAL NURSE SPECIALIST

## 2023-12-04 RX ORDER — DICLOFENAC SODIUM 75 MG/1
75 TABLET, DELAYED RELEASE ORAL 2 TIMES DAILY PRN
Qty: 60 TABLET | Refills: 2 | Status: SHIPPED | OUTPATIENT
Start: 2023-12-04 | End: 2024-03-03

## 2023-12-04 ASSESSMENT — SLU MENTAL STATUS EXAMINATION (SLUMS)
PICK OUT TRIANGLE: 2
WHAT STATE ARE WE IN: 1
WHAT DAY OF THE WEEK IS TODAY: 1
TOTAL SCORE: 22
REMEMBER AND REPEAT FIVE WORDS: 1
PATIENT HAS COMPLETED HIGH SCHOOL OR ABOVE: YES
QUESTIONS ABOUT STORY: 6
BACKWARD DIGIT SPAN: 2
WHAT YEAR IS THIS: 1
PROVIDE NAMES OF ANIMALS: 3
CALCULATE MONEY SPENT AND MONEY LEFT: 2
DRAW A CLOCK: 3

## 2023-12-04 ASSESSMENT — ENCOUNTER SYMPTOMS
WOUND: 0
PHOTOPHOBIA: 0
PALPITATIONS: 0
UNEXPECTED WEIGHT CHANGE: 0
POLYDIPSIA: 0
BLOOD IN STOOL: 0
DIZZINESS: 0
DIARRHEA: 0
CHILLS: 0
COUGH: 0
ARTHRALGIAS: 0
FEVER: 0
JOINT SWELLING: 0
SORE THROAT: 0
HEADACHES: 0
SEIZURES: 0
SLEEP DISTURBANCE: 1
WHEEZING: 0
CHEST TIGHTNESS: 0
BRUISES/BLEEDS EASILY: 0
ABDOMINAL PAIN: 0
BACK PAIN: 0
NECK PAIN: 0
TROUBLE SWALLOWING: 0
CONFUSION: 1
MYALGIAS: 0
FLANK PAIN: 0
CONSTIPATION: 0
HEMATURIA: 0
APPETITE CHANGE: 0
NERVOUS/ANXIOUS: 1
EYE PAIN: 0
VOMITING: 0
SHORTNESS OF BREATH: 0
ACTIVITY CHANGE: 0
DYSURIA: 0
NAUSEA: 0
FATIGUE: 1

## 2023-12-04 NOTE — PROGRESS NOTES
Subjective   Patient ID: Liane Gates is a 78 y.o. female who presents for Follow-up, Blurred Vision (States started since stopped thyroid med), and sluggish.    HPI     Here today as a follow up appointment.       Diagnosed with COVID in December 2021. Struggling with continued complaints of symptoms, some improvement.      She does have a history of reactive airway disease and has had recurrent bronchospasm after pulmonary infections in the past. Previously responded well to steroid inhaler, she has stopped taking it daily secondary to cost. She does have one on hand in case she gets an illness currently not having any shortness of breath or wheezing.      She does have history of depression but notes that symptoms have been well controlled with Wellbutrin. Essential tremor symptoms have been controlled with medication.She does not feel that she needs to increase or change medication.      Essential hypertension. Does not tolerate Spironolactone. Developed a rash on medication. Stopped medication and symptoms have started to resolve. Multiple medication allergies for blood pressure medications. Previously was on Hydrochlorothiazide, no longer tolerated and again stopped medication. Has been wearing her compression stockings.      Trouble tolerating her thyroid medication. Has tried Levothyroxine, Synthroid, [place] Thyroid. Developed side effects. Attempted to restart and symptoms returned.      Complaints of increased neck pain and discomfort. Has been doing at home exercises to try and find relief. Has been using OTC pain relievers and muscle relaxers. Tried Baclofen, Ibuprofen, ASA, Acetaminophen, Hot Packs, Diclofenac, TENS.      feels that Memory has been worsening over the past several years. Worsened after COVID. Safe at home with . Worse with her Short Term Memory.        Review of Systems   Constitutional:  Positive for fatigue. Negative for activity change, appetite change, chills,  "fever and unexpected weight change.   HENT:  Negative for ear pain, hearing loss, nosebleeds, sore throat, tinnitus and trouble swallowing.    Eyes:  Negative for photophobia, pain and visual disturbance.   Respiratory:  Negative for cough, chest tightness, shortness of breath and wheezing.    Cardiovascular:  Negative for chest pain, palpitations and leg swelling.   Gastrointestinal:  Negative for abdominal pain, blood in stool, constipation, diarrhea, nausea and vomiting.   Endocrine: Negative for cold intolerance, heat intolerance, polydipsia and polyuria.   Genitourinary:  Negative for dysuria, flank pain and hematuria.   Musculoskeletal:  Negative for arthralgias, back pain, joint swelling, myalgias and neck pain.   Skin:  Negative for pallor, rash and wound.   Allergic/Immunologic: Negative for immunocompromised state.   Neurological:  Negative for dizziness, seizures and headaches.   Hematological:  Does not bruise/bleed easily.   Psychiatric/Behavioral:  Positive for confusion and sleep disturbance. The patient is nervous/anxious.        Objective   /80 (BP Location: Left arm)   Pulse 68   Ht 1.638 m (5' 4.5\")   Wt 93 kg (205 lb)   BMI 34.64 kg/m²     Physical Exam  Vitals and nursing note reviewed.   Constitutional:       General: She is not in acute distress.     Appearance: Normal appearance.   HENT:      Head: Normocephalic.      Nose: Nose normal.   Eyes:      Conjunctiva/sclera: Conjunctivae normal.   Neck:      Vascular: No carotid bruit.   Cardiovascular:      Rate and Rhythm: Normal rate and regular rhythm.      Pulses: Normal pulses.      Heart sounds: Normal heart sounds.   Pulmonary:      Effort: Pulmonary effort is normal.      Breath sounds: Normal breath sounds.   Abdominal:      General: Bowel sounds are normal.      Palpations: Abdomen is soft.   Musculoskeletal:         General: Normal range of motion.      Cervical back: Normal range of motion.   Skin:     General: Skin is warm " and dry.   Neurological:      Mental Status: She is alert and oriented to person, place, and time. Mental status is at baseline.   Psychiatric:         Mood and Affect: Mood normal.         Behavior: Behavior normal.         Assessment/Plan        Reviewed most recent lab work available with patient.      Obstructive sleep apnea. Last CPAP titration study was done June 2019. Patient doing well on CPAP at night with pressure of 11.   Hypothyroidism. Stopped Levothyroxine, Synthroid, and [place] Thyroid. Did not tolerate. Most recent TSH within normal limits. Has an appointment with FABIOLA to establish and discuss.    Prediabetes. A1C 6.2%. Encouraged to be more consistent with diet. Previously did not tolerate metformin secondary to side effects. We'll continue to monitor hemoglobin A1c in 6 months. Supplies ordered to monitor Glucose.   Reactive airway disease. She has had problems with recurrent bronchospasm after pulmonary infections. Responded previously to Asmanex inhaler but stopped taking daily secondary to cost currently asymptomatic. pulmonary function test in May 2019 was normal.  Obesity: BMI: 34.64. Lifestyle changes recommended: Diet consisting of low fat foods, lean meats, high fiber, fresh fruits and vegetables. 150 min/ weekly aerobic exercise.  Essential tremor. Symptomatically controlled with primidone.  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  Hypertension. Does not tolerate multiple Hypertension medications. Discontinued Spironolactone and Furosemide. Restarted HCTZ, did not tolerate. Screening coronary artery calcium score was 2.4 in May 2019. Follow up with blood pressure readings.   Neck Pain: Imaging ordered. Will follow up pending results.   MCI: [place]ums completed. Declined referral to Neuro, will consider.      DEXA scan was done May 2019. Scheduled for December 2023.   Mammogram: August 2023, negative.    Cologuard was negative November 2020, repeat in 3 years.    Prevnar done November 2017.  Pneumovax done in May 2019.   Flu Vaccine: November 2023.   Medicare Wellness: May 2023.  COVID Vaccine: April/June 2021. Discussed Booster.

## 2023-12-20 LAB — NONINV COLON CA DNA+OCC BLD SCRN STL QL: POSITIVE

## 2023-12-21 ENCOUNTER — TELEPHONE (OUTPATIENT)
Dept: PRIMARY CARE | Facility: CLINIC | Age: 78
End: 2023-12-21
Payer: MEDICARE

## 2023-12-21 DIAGNOSIS — R19.5 POSITIVE COLORECTAL CANCER SCREENING USING COLOGUARD TEST: ICD-10-CM

## 2023-12-21 NOTE — TELEPHONE ENCOUNTER
----- Message from MACK Ba-CNS sent at 12/20/2023  9:54 PM EST -----  Cologuard is positive. Will need Diagnostic Colonoscopy. Thank you!

## 2023-12-29 ENCOUNTER — TELEPHONE (OUTPATIENT)
Dept: GASTROENTEROLOGY | Facility: CLINIC | Age: 78
End: 2023-12-29
Payer: MEDICARE

## 2023-12-29 ENCOUNTER — APPOINTMENT (OUTPATIENT)
Dept: RADIOLOGY | Facility: CLINIC | Age: 78
End: 2023-12-29
Payer: MEDICARE

## 2023-12-29 NOTE — TELEPHONE ENCOUNTER
----- Message from Clara Woodruff MA sent at 12/29/2023 11:04 AM EST -----  Regarding: RE: Colonoscopy screening  [facility] COLONOSCOPY 1.24.24  ENDO  DR. WALL  [facility]  PACKET MAILED 12.29.23  ----- Message -----  From: Lisa Tomlin RN  Sent: 12/22/2023   2:12 PM EST  To: Do [id number] Clerical  Subject: Colonoscopy screening                            Open access

## 2024-01-04 ENCOUNTER — TELEPHONE (OUTPATIENT)
Dept: PRIMARY CARE | Facility: CLINIC | Age: 79
End: 2024-01-04
Payer: MEDICARE

## 2024-01-04 DIAGNOSIS — F32.5 MAJOR DEPRESSION IN REMISSION (CMS-HCC): ICD-10-CM

## 2024-01-04 RX ORDER — BUPROPION HYDROCHLORIDE 300 MG/1
300 TABLET ORAL DAILY
Qty: 90 TABLET | Refills: 1 | Status: SHIPPED | OUTPATIENT
Start: 2024-01-04 | End: 2024-07-02

## 2024-01-04 NOTE — TELEPHONE ENCOUNTER
Med refill     buPROPion XL (Wellbutrin XL) 300 mg 24 hr tablet [[id number]]       [id number] - [place], [place] - [address]  [address], [facility] [address]  Phone: [phone]  Fax: [phone]

## 2024-01-23 ENCOUNTER — PREP FOR PROCEDURE (OUTPATIENT)
Dept: GASTROENTEROLOGY | Facility: CLINIC | Age: 79
End: 2024-01-23
Payer: MEDICARE

## 2024-01-23 ENCOUNTER — ANESTHESIA EVENT (OUTPATIENT)
Dept: GASTROENTEROLOGY | Facility: HOSPITAL | Age: 79
End: 2024-01-23
Payer: MEDICARE

## 2024-01-23 RX ORDER — SODIUM CHLORIDE 9 MG/ML
20 INJECTION, SOLUTION INTRAVENOUS CONTINUOUS
Status: CANCELLED | OUTPATIENT
Start: 2024-01-23

## 2024-01-24 ENCOUNTER — ANESTHESIA (OUTPATIENT)
Dept: GASTROENTEROLOGY | Facility: HOSPITAL | Age: 79
End: 2024-01-24
Payer: MEDICARE

## 2024-01-24 ENCOUNTER — APPOINTMENT (OUTPATIENT)
Dept: GASTROENTEROLOGY | Facility: HOSPITAL | Age: 79
End: 2024-01-24
Payer: MEDICARE

## 2024-01-24 DIAGNOSIS — Z12.11 COLON CANCER SCREENING: Primary | ICD-10-CM

## 2024-01-24 RX ORDER — POLYETHYLENE GLYCOL 3350, SODIUM SULFATE ANHYDROUS, SODIUM BICARBONATE, SODIUM CHLORIDE, POTASSIUM CHLORIDE 236; 22.74; 6.74; 5.86; 2.97 G/4L; G/4L; G/4L; G/4L; G/4L
4000 POWDER, FOR SOLUTION ORAL ONCE
Qty: 4000 ML | Refills: 0 | Status: SHIPPED | OUTPATIENT
Start: 2024-01-24 | End: 2024-01-24

## 2024-01-31 ENCOUNTER — APPOINTMENT (OUTPATIENT)
Dept: RADIOLOGY | Facility: CLINIC | Age: 79
End: 2024-01-31
Payer: MEDICARE

## 2024-02-06 ENCOUNTER — PREP FOR PROCEDURE (OUTPATIENT)
Dept: GASTROENTEROLOGY | Facility: CLINIC | Age: 79
End: 2024-02-06
Payer: MEDICARE

## 2024-02-06 RX ORDER — SODIUM CHLORIDE 9 MG/ML
20 INJECTION, SOLUTION INTRAVENOUS CONTINUOUS
Status: CANCELLED | OUTPATIENT
Start: 2024-02-06

## 2024-02-07 ENCOUNTER — HOSPITAL ENCOUNTER (OUTPATIENT)
Dept: GASTROENTEROLOGY | Facility: HOSPITAL | Age: 79
Discharge: HOME | End: 2024-02-07
Payer: MEDICARE

## 2024-02-07 VITALS
RESPIRATION RATE: 16 BRPM | OXYGEN SATURATION: 94 % | BODY MASS INDEX: 35 KG/M2 | SYSTOLIC BLOOD PRESSURE: 143 MMHG | WEIGHT: 205 LBS | HEIGHT: 64 IN | DIASTOLIC BLOOD PRESSURE: 84 MMHG | TEMPERATURE: 97.4 F | HEART RATE: 70 BPM

## 2024-02-07 DIAGNOSIS — R19.5 POSITIVE COLORECTAL CANCER SCREENING USING COLOGUARD TEST: ICD-10-CM

## 2024-02-07 PROCEDURE — 88305 TISSUE EXAM BY PATHOLOGIST: CPT | Mod: TC,SUR,PORLAB | Performed by: INTERNAL MEDICINE

## 2024-02-07 PROCEDURE — 7100000009 HC PHASE TWO TIME - INITIAL BASE CHARGE

## 2024-02-07 PROCEDURE — 2500000005 HC RX 250 GENERAL PHARMACY W/O HCPCS: Performed by: NURSE ANESTHETIST, CERTIFIED REGISTERED

## 2024-02-07 PROCEDURE — 88305 TISSUE EXAM BY PATHOLOGIST: CPT | Performed by: STUDENT IN AN ORGANIZED HEALTH CARE EDUCATION/TRAINING PROGRAM

## 2024-02-07 PROCEDURE — 3700000002 HC GENERAL ANESTHESIA TIME - EACH INCREMENTAL 1 MINUTE

## 2024-02-07 PROCEDURE — 2500000004 HC RX 250 GENERAL PHARMACY W/ HCPCS (ALT 636 FOR OP/ED): Performed by: INTERNAL MEDICINE

## 2024-02-07 PROCEDURE — 45380 COLONOSCOPY AND BIOPSY: CPT | Performed by: INTERNAL MEDICINE

## 2024-02-07 PROCEDURE — 7100000010 HC PHASE TWO TIME - EACH INCREMENTAL 1 MINUTE

## 2024-02-07 PROCEDURE — 2500000004 HC RX 250 GENERAL PHARMACY W/ HCPCS (ALT 636 FOR OP/ED): Performed by: NURSE ANESTHETIST, CERTIFIED REGISTERED

## 2024-02-07 PROCEDURE — 3700000001 HC GENERAL ANESTHESIA TIME - INITIAL BASE CHARGE

## 2024-02-07 RX ORDER — PROPOFOL 10 MG/ML
INJECTION, EMULSION INTRAVENOUS AS NEEDED
Status: DISCONTINUED | OUTPATIENT
Start: 2024-02-07 | End: 2024-02-07

## 2024-02-07 RX ORDER — LIDOCAINE HYDROCHLORIDE 20 MG/ML
INJECTION, SOLUTION INFILTRATION; PERINEURAL AS NEEDED
Status: DISCONTINUED | OUTPATIENT
Start: 2024-02-07 | End: 2024-02-07

## 2024-02-07 RX ORDER — SODIUM CHLORIDE 9 MG/ML
20 INJECTION, SOLUTION INTRAVENOUS CONTINUOUS
Status: DISCONTINUED | OUTPATIENT
Start: 2024-02-07 | End: 2024-02-08 | Stop reason: HOSPADM

## 2024-02-07 RX ADMIN — LIDOCAINE HYDROCHLORIDE 100 MG: 20 INJECTION, SOLUTION INFILTRATION; PERINEURAL at 11:12

## 2024-02-07 RX ADMIN — SODIUM CHLORIDE 20 ML/HR: 9 INJECTION, SOLUTION INTRAVENOUS at 10:53

## 2024-02-07 RX ADMIN — PROPOFOL 200 MG: 10 INJECTION, EMULSION INTRAVENOUS at 11:12

## 2024-02-07 SDOH — HEALTH STABILITY: MENTAL HEALTH: CURRENT SMOKER: 0

## 2024-02-07 ASSESSMENT — PAIN SCALES - GENERAL
PAINLEVEL_OUTOF10: 0 - NO PAIN
PAINLEVEL_OUTOF10: 6
PAINLEVEL_OUTOF10: 0 - NO PAIN

## 2024-02-07 ASSESSMENT — PAIN - FUNCTIONAL ASSESSMENT
PAIN_FUNCTIONAL_ASSESSMENT: 0-10

## 2024-02-07 NOTE — ANESTHESIA PREPROCEDURE EVALUATION
Patient: Liane Gates    Procedure Information       Anesthesia Start Date/Time: 02/07/24 1102    Scheduled providers: Bin Kwan MD    Procedure: COLONOSCOPY    Location: [facility]            Relevant Problems   Anesthesia (within normal limits)  1x a little slow to wake      Cardiovascular   (+) HTN (hypertension)      Endocrine   (+) Hypothyroidism      Neuro/Psych   (+) Major depression in remission (CMS/HCC)      Pulmonary   (+) REMBERTO on CPAP   (+) Pneumonia      Musculoskeletal   (+) Fibromyalgia       Clinical information reviewed:   Tobacco  Allergies  Meds   Med Hx  Surg Hx  OB Status  Fam Hx  Soc   Hx        NPO Detail:  NPO/Void Status  Date of Last Liquid: 02/07/24  Time of Last Liquid: 0545  Date of Last Solid: 02/05/24  Last Intake Type: Clear fluids         Physical Exam    Airway  Mallampati: III     Cardiovascular - normal exam     Dental    Pulmonary - normal exam     Abdominal          Anesthesia Plan    History of general anesthesia?: yes  History of complications of general anesthesia?: no    ASA 2     MAC     The patient is not a current smoker.    Anesthetic plan and risks discussed with patient.  Use of blood products discussed with who consented to blood products.

## 2024-02-07 NOTE — ANESTHESIA POSTPROCEDURE EVALUATION
Patient: Liane Gates    Procedure Summary       Date: 02/07/24 Room / Location: [facility]    Anesthesia Start: 1104 Anesthesia Stop: 1137    Procedure: COLONOSCOPY Diagnosis: Positive colorectal cancer screening using Cologuard test    Scheduled Providers: Bin Kwan MD Responsible Provider: EVA Dick    Anesthesia Type: MAC ASA Status: 2            Anesthesia Type: MAC    Vitals Value Taken Time   /84 02/07/24 1156   Temp 36.3 °C (97.4 °F) 02/07/24 1156   Pulse 70 02/07/24 1148   Resp 16 02/07/24 1156   SpO2 94 % 02/07/24 1156       Anesthesia Post Evaluation    Patient location during evaluation: bedside  Patient participation: complete - patient participated  Level of consciousness: awake  Pain management: adequate  Airway patency: patent  Cardiovascular status: acceptable  Respiratory status: acceptable  Hydration status: acceptable  Postoperative Nausea and Vomiting: none    There were no known notable events for this encounter.

## 2024-02-07 NOTE — PRE-SEDATION DOCUMENTATION
Patient: Liane Gates  MRN: [id number]    Pre-sedation Evaluation:  Sedation necessary for: Analgesia  Requesting service: GI    History of Present Illness:     Liane Gates is a 78 y.o. female with a history of HTN, hypothyroidism, REMBERTO, pre-diabetes, fibromyalgia, and depression who presents for OPEN ACCESS colonoscopy requested by her PCP to evaluate a positive Cologuard.    She reports that she had a colonoscopy about 10-15 years ago that was normal. There is no family history of colorectal cancer.         Past Medical History:   Diagnosis Date    Acute bronchospasm 12/20/2021    Bronchospasm    Anxiety     Arthritis     Body mass index (BMI) 37.0-37.9, adult 10/28/2021    Body mass index (BMI) of 37.0 to 37.9 in adult    COVID-19 05/17/2022    COVID-19 virus infection    Depression     Elevated C-reactive protein (CRP)     CRP elevated    Encounter for follow-up examination after completed treatment for conditions other than malignant neoplasm 01/11/2022    Hospital discharge follow-up    Fibromyalgia     Hyperlipidemia     Hypertension     Morbid (severe) obesity due to excess calories (CMS/HCC) 04/29/2021    Class 2 severe obesity due to excess calories with serious comorbidity and body mass index (BMI) of 37.0 to 37.9 in adult    Morbid (severe) obesity due to excess calories (CMS/HCC) 10/28/2021    Class 2 severe obesity with serious comorbidity and body mass index (BMI) of 37.0 to 37.9 in adult    Personal history of other diseases of the musculoskeletal system and connective tissue     History of osteopenia    Personal history of other specified conditions     History of impaired glucose tolerance    Personal history of other specified conditions 01/07/2022    History of diarrhea    Sleep apnea     Thyroid disease        Principle problems:  Patient Active Problem List    Diagnosis Date Noted    MCI (mild cognitive impairment) 12/04/2023    Medicare annual wellness visit, subsequent 06/02/2023     Hyperglycemia 06/02/2023    Essential tremor 06/01/2023    Fibromyalgia 06/01/2023    Generalized weakness 06/01/2023    HTN (hypertension) 06/01/2023    Hypothyroidism 06/01/2023    Low vitamin D level 06/01/2023    Major depression in remission (CMS/HCC) 06/01/2023    REMBERTO on CPAP 06/01/2023    Pneumonia 06/01/2023    Prediabetes 06/01/2023    Reactive airway disease 06/01/2023     Allergies:  Allergies   Allergen Reactions    Amlodipine Swelling     Mouth     Lisinopril Swelling     Mouth    Carvedilol Hives, Itching and Swelling    Furosemide Rash    Hydrochlorothiazide Rash    Metformin Hives and Rash    Penicillins Hives and Rash    Spironolactone Rash     PTA/Current Medications:  (Not in a hospital admission)    Current Outpatient Medications   Medication Sig Dispense Refill    ascorbic acid (Vitamin C) 500 mg tablet Take 1 tablet (500 mg) by mouth once daily.      buPROPion XL (Wellbutrin XL) 300 mg 24 hr tablet Take 1 tablet (300 mg) by mouth once daily. 90 tablet 1    cyanocobalamin (Vitamin B-12) 1,000 mcg tablet Take 1 tablet (1,000 mcg) by mouth once daily.      diclofenac (Voltaren) 75 mg EC tablet Take 1 tablet (75 mg) by mouth 2 times a day as needed (pain). Do not crush, chew, or split. 60 tablet 2    ergocalciferol (Vitamin D-2) 50 MCG (2000 UT) capsule capsule Take 1 capsule (50 mcg) by mouth once daily.      fish oil concentrate (Omega-3) 120-180 mg capsule Take 1 capsule (1 g) by mouth once daily.      FreeStyle glucose monitoring (FreeStyle Lite Meter) kit 1 each once daily. Give what is covered by insurance 1 each 0    multivitamin tablet Take 1 tablet by mouth once daily.      blood sugar diagnostic (FreeStyle Lite Strips) strip 1 strip once daily. Give what is covered by insurance 100 strip 11    FreeStyle Lancets 28 gauge Use as instructed give what is covered by insurance 100 each 11    primidone (Mysoline) 50 mg tablet Take 1 tablet (50 mg) by mouth 3 times a day. 270 tablet 1     Current  Facility-Administered Medications   Medication Dose Route Frequency Provider Last Rate Last Admin    sodium chloride 0.9% infusion  20 mL/hr intravenous Continuous Bin Kwan MD 20 mL/hr at 02/07/24 1053 20 mL/hr at 02/07/24 1053     Past Surgical History:   has a past surgical history that includes Other surgical history (02/24/2020); Other surgical history (02/24/2020); Other surgical history (02/24/2020); and Other surgical history (02/24/2020).    Recent sedation/surgery (24 hours) No    Review of Systems:  Please check all that apply: Obesity    Pregnancy test completed prior to procedure on any menstruating female: none        NPO guidelines met: Yes    Physical Exam    Airway  Mallampati: II  Neck ROM: full  Comments: Normal mouth opening.   Cardiovascular   Rhythm: regular  Rate: normal  (-) murmur     Dental    Pulmonary   Breath sounds clear to auscultation  (-) wheezes       Other findings: Abdomen is soft, nontender, and not distended.    Patient is calm appearing and in no apparent distress.    Patient is awake and alert and oriented x4.      Plan    ASA 2     Moderate   (Sedation medications to be delivered via monitored anesthesia care (MAC).    This evaluation serves as my H&P.    Outpatient medication list and allergies have been reviewed.  Pre-procedure/enoc procedure antibiotics not needed.    Pre-procedure evaluation completed by physician.    Surgeon has reviewed key risks related to the risk of kj COVID-19 and if they contract COVID-19 what the risks are.)

## 2024-02-07 NOTE — LETTER
[facility]  [address] [facility] [address] [phone]  FAX  [phone]      February 12, 2024     Liane Gates  [address] [place] [id number]      Dear Liane:    Below are the results from your recent visit:      The polyp that I removed during your recent colonoscopy was a tubular adenoma on pathology.  This type of polyp is not a cancer, but it is the type of polyp that could grow into a cancer over time.  Any polyps that were removed will not grow into a cancer, but having polyps like this can increase your risk of developing other polyps or eventually a cancer.  Because of that risk I would recommend that you have another colonoscopy in about 5 years to look for other polyps.     If you have any other questions or concerns please do not hesitate to call me at my office at [phone].     Repeat Colonoscopy Interval: 5 years    Bin Kwan MD

## 2024-02-08 ENCOUNTER — TELEPHONE (OUTPATIENT)
Dept: PRIMARY CARE | Facility: CLINIC | Age: 79
End: 2024-02-08
Payer: MEDICARE

## 2024-02-08 DIAGNOSIS — R73.03 PREDIABETES: ICD-10-CM

## 2024-02-08 RX ORDER — BLOOD-GLUCOSE METER
1 KIT MISCELLANEOUS DAILY
Qty: 100 STRIP | Refills: 11 | Status: SHIPPED | OUTPATIENT
Start: 2024-02-08

## 2024-02-08 RX ORDER — LANCETS 28 GAUGE
EACH MISCELLANEOUS
Qty: 100 EACH | Refills: 11 | Status: SHIPPED | OUTPATIENT
Start: 2024-02-08 | End: 2025-02-07

## 2024-02-08 RX ORDER — INSULIN PUMP SYRINGE, 3 ML
1 EACH MISCELLANEOUS DAILY
Qty: 1 EACH | Refills: 0 | Status: SHIPPED | OUTPATIENT
Start: 2024-02-08 | End: 2025-02-07

## 2024-02-08 NOTE — TELEPHONE ENCOUNTER
She is requesting Rx for a new glucose meter & supplies    To [facility]

## 2024-02-08 NOTE — ADDENDUM NOTE
Encounter addended by: Reno Souza RN on: 2/8/2024 10:04 AM   Actions taken: Contacts section saved, Flowsheet accepted

## 2024-02-09 LAB
LABORATORY COMMENT REPORT: NORMAL
PATH REPORT.FINAL DX SPEC: NORMAL
PATH REPORT.GROSS SPEC: NORMAL
PATH REPORT.RELEVANT HX SPEC: NORMAL
PATH REPORT.TOTAL CANCER: NORMAL

## 2024-02-13 ENCOUNTER — TELEPHONE (OUTPATIENT)
Dept: PRIMARY CARE | Facility: CLINIC | Age: 79
End: 2024-02-13
Payer: MEDICARE

## 2024-02-13 DIAGNOSIS — G25.0 ESSENTIAL TREMOR: ICD-10-CM

## 2024-02-13 RX ORDER — PRIMIDONE 50 MG/1
50 TABLET ORAL 3 TIMES DAILY
Qty: 270 TABLET | Refills: 1 | Status: SHIPPED | OUTPATIENT
Start: 2024-02-13 | End: 2024-08-11

## 2024-02-13 NOTE — TELEPHONE ENCOUNTER
Rx Refill Request Telephone Encounter    Name:  Lianemuriel Gates  :  [id number]  Medication Name:  Primidone  50 mg          Specific Pharmacy location:  [facility]/[place]

## 2024-02-20 ENCOUNTER — OFFICE VISIT (OUTPATIENT)
Dept: ENDOCRINOLOGY | Facility: CLINIC | Age: 79
End: 2024-02-20
Payer: MEDICARE

## 2024-02-20 VITALS
DIASTOLIC BLOOD PRESSURE: 72 MMHG | BODY MASS INDEX: 34.49 KG/M2 | HEIGHT: 64 IN | HEART RATE: 78 BPM | SYSTOLIC BLOOD PRESSURE: 128 MMHG | WEIGHT: 202 LBS

## 2024-02-20 DIAGNOSIS — E03.9 ACQUIRED HYPOTHYROIDISM: Primary | ICD-10-CM

## 2024-02-20 PROCEDURE — 99213 OFFICE O/P EST LOW 20 MIN: CPT | Performed by: INTERNAL MEDICINE

## 2024-02-20 PROCEDURE — 1160F RVW MEDS BY RX/DR IN RCRD: CPT | Performed by: INTERNAL MEDICINE

## 2024-02-20 PROCEDURE — 1159F MED LIST DOCD IN RCRD: CPT | Performed by: INTERNAL MEDICINE

## 2024-02-20 PROCEDURE — 1126F AMNT PAIN NOTED NONE PRSNT: CPT | Performed by: INTERNAL MEDICINE

## 2024-02-20 PROCEDURE — 3074F SYST BP LT 130 MM HG: CPT | Performed by: INTERNAL MEDICINE

## 2024-02-20 PROCEDURE — 1036F TOBACCO NON-USER: CPT | Performed by: INTERNAL MEDICINE

## 2024-02-20 PROCEDURE — 3078F DIAST BP <80 MM HG: CPT | Performed by: INTERNAL MEDICINE

## 2024-02-20 PROCEDURE — 1123F ACP DISCUSS/DSCN MKR DOCD: CPT | Performed by: INTERNAL MEDICINE

## 2024-02-20 RX ORDER — BACLOFEN 10 MG/1
10 TABLET ORAL 3 TIMES DAILY
COMMUNITY

## 2024-02-20 ASSESSMENT — ENCOUNTER SYMPTOMS: WEAKNESS: 1

## 2024-02-20 NOTE — PROGRESS NOTES
"Subjective   Liane Gates is a 78 y.o. female who I am asked to see in consultation for evaluation of thyroid function. She is here with her .  She was diangosed in the mid 1970s.    She has not been on any thyroid medications for five months.      Previous medications:  Levothyroxine - cannot recall why it was stopped; she took for years   Synthroid - rash on her chest   Golden Thyroid- she felt diffusely ill;  hard to describe     She has memory problems since having COVID-19 three years ago.  She was hospitalized for one week. 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  She could not tolerate PT.      Symptoms are as listed below:  Energy levels -  fatigued  Strength - diffusely weak  Daytime naps - admits at various times   Sleep -  6 hours with CPAP; refreshing untils she takes Pramidone   Temperature intolerances -  fluctuates between two extremes   Bowel movements -  constipated;  can go one week without; takes laxative every week; she has had 3 abdominal surgeries   Hair changes -  shedding   Skin changes -  more dry  Nails - peeling; takes biotin   Tremors - has essential tremors   Dysphagia - denies   Dyspnea upon lying supine -  denies   Dysphonia -  denies   Weight changes -  lost 5-6 pounds with increased exercise       Review of Systems   Neurological:  Positive for weakness (Has fibromylagia).   All other systems reviewed and are negative.      Objective   /72 (BP Location: Left arm, Patient Position: Sitting, BP Cuff Size: Large adult)   Pulse 78   Ht 1.626 m (5' 4\")   Wt 91.6 kg (202 lb)   BMI 34.67 kg/m²    Physical Exam  Vitals and nursing note reviewed.   Constitutional:       General: She is not in acute distress.     Appearance: Normal appearance. She is obese.   HENT:      Head: Normocephalic and atraumatic.      Nose: Nose normal.      Mouth/Throat:      Mouth: Mucous membranes are moist.   Eyes:      Extraocular Movements: Extraocular movements intact.   Cardiovascular: "      Rate and Rhythm: Normal rate and regular rhythm.   Pulmonary:      Effort: Pulmonary effort is normal.      Breath sounds: Normal breath sounds.   Musculoskeletal:         General: Normal range of motion.   Skin:     General: Skin is warm.   Neurological:      Mental Status: She is alert and oriented to person, place, and time.   Psychiatric:         Mood and Affect: Mood normal.         Lab Results   Component Value Date    TSH 3.97 11/30/2023    FREET4 0.75 10/30/2023       Assessment/Plan   78-year-old female presents for the evaluation and further management of hypothyroidism.  She has not been on thyroid replacement therapy for at least 5 months.  She previously had intolerances to Synthroid and [place] Thyroid.    Hypothyroidism  To obtain blood tests after stopping biotin for one week   Counseled that biotin interferes with the TSH assay  Please do not take an iodine supplement   Will follow up with results

## 2024-02-20 NOTE — PATIENT INSTRUCTIONS
Thank you for choosing [facility]  for your health care needs.  If you have any questions, concerns or medical needs, please feel free to contact our office at [phone].    Please ensure you complete your blood work one week before the next scheduled appointment.    To obtain blood tests after stopping biotin for one week   Please do not take an iodine supplement   Will follow up with results

## 2024-02-25 NOTE — ASSESSMENT & PLAN NOTE
To obtain blood tests after stopping biotin for one week   Counseled that biotin interferes with the TSH assay  Please do not take an iodine supplement   Will follow up with results

## 2024-03-18 ENCOUNTER — APPOINTMENT (OUTPATIENT)
Dept: RADIOLOGY | Facility: CLINIC | Age: 79
End: 2024-03-18
Payer: MEDICARE

## 2024-04-01 ENCOUNTER — LAB (OUTPATIENT)
Dept: LAB | Facility: LAB | Age: 79
End: 2024-04-01
Payer: MEDICARE

## 2024-04-01 DIAGNOSIS — E03.9 ACQUIRED HYPOTHYROIDISM: ICD-10-CM

## 2024-04-01 PROCEDURE — 86376 MICROSOMAL ANTIBODY EACH: CPT

## 2024-04-01 PROCEDURE — 83516 IMMUNOASSAY NONANTIBODY: CPT

## 2024-04-01 PROCEDURE — 36415 COLL VENOUS BLD VENIPUNCTURE: CPT

## 2024-04-02 ENCOUNTER — TELEPHONE (OUTPATIENT)
Dept: ENDOCRINOLOGY | Facility: CLINIC | Age: 79
End: 2024-04-02
Payer: MEDICARE

## 2024-04-02 LAB
GLIADIN PEPTIDE IGA SER IA-ACNC: <1 U/ML
THYROPEROXIDASE AB SERPL-ACNC: <28 IU/ML
TTG IGA SER IA-ACNC: <1 U/ML

## 2024-04-02 NOTE — TELEPHONE ENCOUNTER
TSH with reflex to free T4 was cancelled. A new order is needed for redraw. Patient to be notified patient once labs have placed.

## 2024-04-03 LAB
GLIADIN PEPTIDE IGG SER IA-ACNC: <0.56 FLU (ref 0–4.99)
TTG IGG SER IA-ACNC: <0.82 FLU (ref 0–4.99)

## 2024-04-05 DIAGNOSIS — E03.9 ACQUIRED HYPOTHYROIDISM: Primary | ICD-10-CM

## 2024-04-08 ENCOUNTER — TELEPHONE (OUTPATIENT)
Dept: ENDOCRINOLOGY | Facility: CLINIC | Age: 79
End: 2024-04-08
Payer: MEDICARE

## 2024-04-08 NOTE — TELEPHONE ENCOUNTER
----- Message from Natalie Sainz MD sent at 4/8/2024  5:28 AM EDT -----  Labs have been reviewed.  Antibody testing was negative for Hashimoto's thyroiditis as well as celiac disease.  I will await the thyroid levels as the original order was canceled by the lab as the quantity was not sufficient.

## 2024-04-08 NOTE — RESULT ENCOUNTER NOTE
Labs have been reviewed.  Antibody testing was negative for Hashimoto's thyroiditis as well as celiac disease.  I will await the thyroid levels as the original order was canceled by the lab as the quantity was not sufficient.

## 2024-04-30 ENCOUNTER — TELEPHONE (OUTPATIENT)
Dept: PRIMARY CARE | Facility: CLINIC | Age: 79
End: 2024-04-30
Payer: MEDICARE

## 2024-04-30 DIAGNOSIS — Z78.0 POSTMENOPAUSAL: Primary | ICD-10-CM

## 2024-05-08 ENCOUNTER — LAB (OUTPATIENT)
Dept: LAB | Facility: LAB | Age: 79
End: 2024-05-08
Payer: MEDICARE

## 2024-05-08 DIAGNOSIS — E03.9 ACQUIRED HYPOTHYROIDISM: ICD-10-CM

## 2024-05-08 LAB
T4 FREE SERPL-MCNC: 0.78 NG/DL (ref 0.61–1.12)
TSH SERPL-ACNC: 4.03 MIU/L (ref 0.44–3.98)

## 2024-05-08 PROCEDURE — 84439 ASSAY OF FREE THYROXINE: CPT

## 2024-05-08 PROCEDURE — 84443 ASSAY THYROID STIM HORMONE: CPT

## 2024-05-08 PROCEDURE — 36415 COLL VENOUS BLD VENIPUNCTURE: CPT

## 2024-05-13 ENCOUNTER — TELEPHONE (OUTPATIENT)
Dept: ENDOCRINOLOGY | Facility: CLINIC | Age: 79
End: 2024-05-13
Payer: MEDICARE

## 2024-05-20 NOTE — TELEPHONE ENCOUNTER
Patient called and left voicemail request lab results. Dr. Sainz has not completed notation as of yet.

## 2024-05-22 NOTE — TELEPHONE ENCOUNTER
Patient called requesting lab results, notified patient that we will contact her with results as soon as  the results.

## 2024-05-24 DIAGNOSIS — E03.9 ACQUIRED HYPOTHYROIDISM: Primary | ICD-10-CM

## 2024-05-24 RX ORDER — LEVOTHYROXINE SODIUM 13 UG/1
13 CAPSULE ORAL DAILY
Qty: 30 CAPSULE | Refills: 5 | Status: SHIPPED | OUTPATIENT
Start: 2024-05-24 | End: 2024-11-20

## 2024-05-24 NOTE — RESULT ENCOUNTER NOTE
Labs have been reviewed.  The TSH was minimally elevated at 4.03 with a normal FT4 value.  This a value that was obtained after being off thyroid replacement therapy for five months.  Given previous intolerances, I would suggest Tirosint 13mcg po daily.  Please schedule a follow up in 4 months for reassessment.

## 2024-05-30 ENCOUNTER — TELEPHONE (OUTPATIENT)
Dept: PRIMARY CARE | Facility: CLINIC | Age: 79
End: 2024-05-30
Payer: MEDICARE

## 2024-05-30 DIAGNOSIS — E55.9 VITAMIN D DEFICIENCY: Primary | ICD-10-CM

## 2024-05-30 DIAGNOSIS — I10 PRIMARY HYPERTENSION: ICD-10-CM

## 2024-05-30 DIAGNOSIS — R73.03 PREDIABETES: ICD-10-CM

## 2024-05-30 DIAGNOSIS — R73.9 HYPERGLYCEMIA: ICD-10-CM

## 2024-05-31 ENCOUNTER — LAB (OUTPATIENT)
Dept: LAB | Facility: LAB | Age: 79
End: 2024-05-31
Payer: MEDICARE

## 2024-05-31 DIAGNOSIS — R73.03 PREDIABETES: ICD-10-CM

## 2024-05-31 DIAGNOSIS — R73.9 HYPERGLYCEMIA: ICD-10-CM

## 2024-05-31 DIAGNOSIS — E55.9 VITAMIN D DEFICIENCY: ICD-10-CM

## 2024-05-31 DIAGNOSIS — I10 PRIMARY HYPERTENSION: ICD-10-CM

## 2024-05-31 LAB
25(OH)D3 SERPL-MCNC: >120 NG/ML (ref 30–100)
ALBUMIN SERPL BCP-MCNC: 4.2 G/DL (ref 3.4–5)
ALP SERPL-CCNC: 68 U/L (ref 33–136)
ALT SERPL W P-5'-P-CCNC: 30 U/L (ref 7–45)
ANION GAP SERPL CALC-SCNC: 13 MMOL/L (ref 10–20)
AST SERPL W P-5'-P-CCNC: 22 U/L (ref 9–39)
BILIRUB SERPL-MCNC: 0.6 MG/DL (ref 0–1.2)
BUN SERPL-MCNC: 23 MG/DL (ref 6–23)
CALCIUM SERPL-MCNC: 9.3 MG/DL (ref 8.6–10.3)
CHLORIDE SERPL-SCNC: 100 MMOL/L (ref 98–107)
CHOLEST SERPL-MCNC: 172 MG/DL (ref 0–199)
CHOLESTEROL/HDL RATIO: 2.3
CO2 SERPL-SCNC: 31 MMOL/L (ref 21–32)
CREAT SERPL-MCNC: 0.76 MG/DL (ref 0.5–1.05)
EGFRCR SERPLBLD CKD-EPI 2021: 81 ML/MIN/1.73M*2
ERYTHROCYTE [DISTWIDTH] IN BLOOD BY AUTOMATED COUNT: 13.6 % (ref 11.5–14.5)
GLUCOSE SERPL-MCNC: 119 MG/DL (ref 74–99)
HCT VFR BLD AUTO: 46 % (ref 36–46)
HDLC SERPL-MCNC: 75.2 MG/DL
HGB BLD-MCNC: 15.8 G/DL (ref 12–16)
LDLC SERPL CALC-MCNC: 88 MG/DL
MCH RBC QN AUTO: 32.9 PG (ref 26–34)
MCHC RBC AUTO-ENTMCNC: 34.3 G/DL (ref 32–36)
MCV RBC AUTO: 96 FL (ref 80–100)
NON HDL CHOLESTEROL: 97 MG/DL (ref 0–149)
NRBC BLD-RTO: 0 /100 WBCS (ref 0–0)
PLATELET # BLD AUTO: 221 X10*3/UL (ref 150–450)
POTASSIUM SERPL-SCNC: 3.9 MMOL/L (ref 3.5–5.3)
PROT SERPL-MCNC: 6.5 G/DL (ref 6.4–8.2)
RBC # BLD AUTO: 4.8 X10*6/UL (ref 4–5.2)
SODIUM SERPL-SCNC: 140 MMOL/L (ref 136–145)
TRIGL SERPL-MCNC: 46 MG/DL (ref 0–149)
VIT B12 SERPL-MCNC: 1594 PG/ML (ref 211–911)
VLDL: 9 MG/DL (ref 0–40)
WBC # BLD AUTO: 4.1 X10*3/UL (ref 4.4–11.3)

## 2024-05-31 PROCEDURE — 85027 COMPLETE CBC AUTOMATED: CPT

## 2024-05-31 PROCEDURE — 83036 HEMOGLOBIN GLYCOSYLATED A1C: CPT

## 2024-05-31 PROCEDURE — 80061 LIPID PANEL: CPT

## 2024-05-31 PROCEDURE — 82306 VITAMIN D 25 HYDROXY: CPT

## 2024-05-31 PROCEDURE — 80053 COMPREHEN METABOLIC PANEL: CPT

## 2024-05-31 PROCEDURE — 36415 COLL VENOUS BLD VENIPUNCTURE: CPT

## 2024-05-31 PROCEDURE — 82607 VITAMIN B-12: CPT

## 2024-06-01 LAB
EST. AVERAGE GLUCOSE BLD GHB EST-MCNC: 120 MG/DL
HBA1C MFR BLD: 5.8 %

## 2024-06-04 ENCOUNTER — TELEMEDICINE (OUTPATIENT)
Dept: PRIMARY CARE | Facility: CLINIC | Age: 79
End: 2024-06-04
Payer: MEDICARE

## 2024-06-04 VITALS
WEIGHT: 196 LBS | HEIGHT: 64 IN | BODY MASS INDEX: 33.46 KG/M2 | HEART RATE: 75 BPM | SYSTOLIC BLOOD PRESSURE: 162 MMHG | DIASTOLIC BLOOD PRESSURE: 94 MMHG

## 2024-06-04 DIAGNOSIS — G25.0 ESSENTIAL TREMOR: ICD-10-CM

## 2024-06-04 DIAGNOSIS — E03.9 ACQUIRED HYPOTHYROIDISM: ICD-10-CM

## 2024-06-04 DIAGNOSIS — G31.84 MCI (MILD COGNITIVE IMPAIRMENT): ICD-10-CM

## 2024-06-04 DIAGNOSIS — F32.5 MAJOR DEPRESSION IN REMISSION (CMS-HCC): ICD-10-CM

## 2024-06-04 DIAGNOSIS — R73.03 PREDIABETES: ICD-10-CM

## 2024-06-04 DIAGNOSIS — Z12.11 COLON CANCER SCREENING: ICD-10-CM

## 2024-06-04 DIAGNOSIS — M19.90 OSTEOARTHRITIS, UNSPECIFIED OSTEOARTHRITIS TYPE, UNSPECIFIED SITE: ICD-10-CM

## 2024-06-04 DIAGNOSIS — Z12.31 VISIT FOR SCREENING MAMMOGRAM: Primary | ICD-10-CM

## 2024-06-04 DIAGNOSIS — E55.9 VITAMIN D DEFICIENCY: ICD-10-CM

## 2024-06-04 DIAGNOSIS — I10 PRIMARY HYPERTENSION: ICD-10-CM

## 2024-06-04 DIAGNOSIS — J45.909 REACTIVE AIRWAY DISEASE WITHOUT COMPLICATION, UNSPECIFIED ASTHMA SEVERITY, UNSPECIFIED WHETHER PERSISTENT (HHS-HCC): ICD-10-CM

## 2024-06-04 DIAGNOSIS — G47.33 OSA ON CPAP: ICD-10-CM

## 2024-06-04 PROCEDURE — 3077F SYST BP >= 140 MM HG: CPT | Performed by: CLINICAL NURSE SPECIALIST

## 2024-06-04 PROCEDURE — 1123F ACP DISCUSS/DSCN MKR DOCD: CPT | Performed by: CLINICAL NURSE SPECIALIST

## 2024-06-04 PROCEDURE — 3080F DIAST BP >= 90 MM HG: CPT | Performed by: CLINICAL NURSE SPECIALIST

## 2024-06-04 PROCEDURE — 1159F MED LIST DOCD IN RCRD: CPT | Performed by: CLINICAL NURSE SPECIALIST

## 2024-06-04 PROCEDURE — 1036F TOBACCO NON-USER: CPT | Performed by: CLINICAL NURSE SPECIALIST

## 2024-06-04 PROCEDURE — 99442 PR PHYS/QHP TELEPHONE EVALUATION 11-20 MIN: CPT | Performed by: CLINICAL NURSE SPECIALIST

## 2024-06-04 ASSESSMENT — ENCOUNTER SYMPTOMS
ABDOMINAL PAIN: 0
WHEEZING: 0
HEADACHES: 0
DYSURIA: 0
POLYDIPSIA: 0
DIZZINESS: 0
FLANK PAIN: 0
NAUSEA: 0
ARTHRALGIAS: 0
ACTIVITY CHANGE: 0
APPETITE CHANGE: 0
CHEST TIGHTNESS: 0
SORE THROAT: 0
BRUISES/BLEEDS EASILY: 0
CONSTIPATION: 0
UNEXPECTED WEIGHT CHANGE: 0
SHORTNESS OF BREATH: 0
TROUBLE SWALLOWING: 0
HEMATURIA: 0
NECK PAIN: 0
DIARRHEA: 1
PALPITATIONS: 0
MYALGIAS: 0
CHILLS: 0
FATIGUE: 0
BACK PAIN: 0
FEVER: 0
VOMITING: 0
JOINT SWELLING: 0
BLOOD IN STOOL: 0
CONFUSION: 1
SEIZURES: 0
EYE PAIN: 0
COUGH: 0
WOUND: 0
SLEEP DISTURBANCE: 0
PHOTOPHOBIA: 0

## 2024-06-04 NOTE — PROGRESS NOTES
Subjective   Patient ID: Liane Gates is a 78 y.o. female who presents for telephone  (Follow up /) and Diarrhea.  Diarrhea   Pertinent negatives include no abdominal pain, arthralgias, chills, coughing, fever, headaches, myalgias or vomiting.       Telephone visit today as a follow up appointment at patient's request.     Recently adjustment to her Thyroid medication from Endo and states that she is not sure that she is tolerating well. Diarrhea this morning, has started to show improvement.      Diagnosed with COVID in December 2021. Struggling with continued complaints of symptoms, some improvement.      She does have a history of reactive airway disease and has had recurrent bronchospasm after pulmonary infections in the past. Previously responded well to steroid inhaler, she has stopped taking it daily secondary to cost. She does have one on hand in case she gets an illness currently not having any shortness of breath or wheezing.      She does have history of depression but notes that symptoms have been well controlled with Wellbutrin. Essential tremor symptoms have been controlled with medication.She does not feel that she needs to increase or change medication.      Essential hypertension. Does not tolerate Spironolactone. Developed a rash on medication. Stopped medication and symptoms have started to resolve. Multiple medication allergies for blood pressure medications. Previously was on Hydrochlorothiazide, no longer tolerated and again stopped medication. Has been wearing her compression stockings.      Trouble tolerating her thyroid medication. Has tried Levothyroxine, Synthroid, Gill Thyroid. Developed side effects. Attempted to restart and symptoms returned.      Complaints of increased neck pain and discomfort. Has been doing at home exercises to try and find relief. Has been using OTC pain relievers and muscle relaxers. Tried Baclofen, Ibuprofen, ASA, Acetaminophen, Hot Packs, Diclofenac,  TENS.       feels that Memory has been worsening over the past several years, noted at last OV. Worsened after COVID. Safe at home with . Worse with her Short Term Memory. SLUMS completed last visit. Had declined Neuro at that time, now interested.      Review of Systems   Constitutional:  Negative for activity change, appetite change, chills, fatigue, fever and unexpected weight change.   HENT:  Negative for ear pain, hearing loss, nosebleeds, sore throat, tinnitus and trouble swallowing.    Eyes:  Negative for photophobia, pain and visual disturbance.   Respiratory:  Negative for cough, chest tightness, shortness of breath and wheezing.    Cardiovascular:  Negative for chest pain, palpitations and leg swelling.   Gastrointestinal:  Positive for diarrhea. Negative for abdominal pain, blood in stool, constipation, nausea and vomiting.   Endocrine: Negative for cold intolerance, heat intolerance, polydipsia and polyuria.   Genitourinary:  Negative for dysuria, flank pain and hematuria.   Musculoskeletal:  Negative for arthralgias, back pain, joint swelling, myalgias and neck pain.   Skin:  Negative for pallor, rash and wound.   Allergic/Immunologic: Negative for immunocompromised state.   Neurological:  Negative for dizziness, seizures and headaches.   Hematological:  Does not bruise/bleed easily.   Psychiatric/Behavioral:  Positive for confusion. Negative for sleep disturbance.        Objective   Physical Exam  Nursing note reviewed.   Psychiatric:         Mood and Affect: Mood normal.         Behavior: Behavior normal.         Assessment/Plan         Reviewed most recent lab work available with patient.      Obstructive sleep apnea. Last CPAP titration study was done June 2019. Patient doing well on CPAP at night with pressure of 11.   Hypothyroidism. Monitoring with Endocrinology. Medication adjustments.   Prediabetes. A1C 5.8%. Encouraged to be more consistent with diet. Previously did not tolerate  metformin secondary to side effects. We'll continue to monitor hemoglobin A1c in 6 months. Supplies ordered to monitor Glucose.   Reactive airway disease. She has had problems with recurrent bronchospasm after pulmonary infections. Responded previously to Asmanex inhaler but stopped taking daily secondary to cost currently asymptomatic. pulmonary function test in May 2019 was normal.  Obesity: Most recent BMI: 33.64. Lifestyle changes recommended: Diet consisting of low fat foods, lean meats, high fiber, fresh fruits and vegetables. 150 min/ weekly aerobic exercise.  Essential tremor. Symptomatically controlled with primidone.  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  Hypertension. Does not tolerate multiple Hypertension medications. Discontinued Spironolactone and Furosemide. Restarted HCTZ, did not tolerate. Screening coronary artery calcium score was 2.4 in May 2019. Ordered for 2024. Follow up with blood pressure readings.   MCI: Slums completed at last OV. Will obtain updated at next follow up appointment. Referral to Neurology.      DEXA scan was done May 2019. Scheduled for June 2024.   Mammogram: August 2023, negative.  Ordered for 2024.   Colonoscopy: February 2024, plan to repeat in 5 years.   Prevnar done November 2017.  Pneumovax done in May 2019.   Flu Vaccine: November 2023.   Medicare Wellness: May 2023.  COVID Vaccine: April/June 2021. Discussed Booster.    The patient was interviewed via a secure video link due to the COVID pandemic. The link allowed real-time communication between the patient and the provider. The patient gave permission to perform the clinic visit through this mechanism. We were on the telephone call for approximately 12 minutes. More than 50% of that time was spent in counseling and coordination of care. No physical examination was performed.     MACK Ba-CNS 06/04/24 1:11 PM

## 2024-06-14 ENCOUNTER — APPOINTMENT (OUTPATIENT)
Dept: RADIOLOGY | Facility: CLINIC | Age: 79
End: 2024-06-14
Payer: MEDICARE

## 2024-07-03 ENCOUNTER — APPOINTMENT (OUTPATIENT)
Dept: PRIMARY CARE | Facility: CLINIC | Age: 79
End: 2024-07-03
Payer: MEDICARE

## 2024-07-03 VITALS
HEART RATE: 89 BPM | HEIGHT: 64 IN | DIASTOLIC BLOOD PRESSURE: 94 MMHG | BODY MASS INDEX: 33.64 KG/M2 | OXYGEN SATURATION: 95 % | SYSTOLIC BLOOD PRESSURE: 144 MMHG

## 2024-07-03 DIAGNOSIS — G25.0 ESSENTIAL TREMOR: ICD-10-CM

## 2024-07-03 DIAGNOSIS — G47.33 OSA ON CPAP: ICD-10-CM

## 2024-07-03 DIAGNOSIS — J45.909 REACTIVE AIRWAY DISEASE WITHOUT COMPLICATION, UNSPECIFIED ASTHMA SEVERITY, UNSPECIFIED WHETHER PERSISTENT (HHS-HCC): ICD-10-CM

## 2024-07-03 DIAGNOSIS — E03.9 ACQUIRED HYPOTHYROIDISM: ICD-10-CM

## 2024-07-03 DIAGNOSIS — M19.90 OSTEOARTHRITIS, UNSPECIFIED OSTEOARTHRITIS TYPE, UNSPECIFIED SITE: ICD-10-CM

## 2024-07-03 DIAGNOSIS — I10 PRIMARY HYPERTENSION: ICD-10-CM

## 2024-07-03 DIAGNOSIS — Z12.31 VISIT FOR SCREENING MAMMOGRAM: ICD-10-CM

## 2024-07-03 DIAGNOSIS — F32.5 MAJOR DEPRESSION IN REMISSION (CMS-HCC): ICD-10-CM

## 2024-07-03 DIAGNOSIS — Z12.11 COLON CANCER SCREENING: ICD-10-CM

## 2024-07-03 DIAGNOSIS — R73.03 PREDIABETES: ICD-10-CM

## 2024-07-03 DIAGNOSIS — Z00.00 MEDICARE ANNUAL WELLNESS VISIT, SUBSEQUENT: Primary | ICD-10-CM

## 2024-07-03 DIAGNOSIS — E55.9 VITAMIN D DEFICIENCY: ICD-10-CM

## 2024-07-03 PROCEDURE — G0444 DEPRESSION SCREEN ANNUAL: HCPCS | Performed by: CLINICAL NURSE SPECIALIST

## 2024-07-03 PROCEDURE — 1123F ACP DISCUSS/DSCN MKR DOCD: CPT | Performed by: CLINICAL NURSE SPECIALIST

## 2024-07-03 PROCEDURE — 3075F SYST BP GE 130 - 139MM HG: CPT | Performed by: CLINICAL NURSE SPECIALIST

## 2024-07-03 PROCEDURE — 1159F MED LIST DOCD IN RCRD: CPT | Performed by: CLINICAL NURSE SPECIALIST

## 2024-07-03 PROCEDURE — 99214 OFFICE O/P EST MOD 30 MIN: CPT | Performed by: CLINICAL NURSE SPECIALIST

## 2024-07-03 PROCEDURE — 1036F TOBACCO NON-USER: CPT | Performed by: CLINICAL NURSE SPECIALIST

## 2024-07-03 PROCEDURE — 3079F DIAST BP 80-89 MM HG: CPT | Performed by: CLINICAL NURSE SPECIALIST

## 2024-07-03 PROCEDURE — 1160F RVW MEDS BY RX/DR IN RCRD: CPT | Performed by: CLINICAL NURSE SPECIALIST

## 2024-07-03 PROCEDURE — G0439 PPPS, SUBSEQ VISIT: HCPCS | Performed by: CLINICAL NURSE SPECIALIST

## 2024-07-03 PROCEDURE — 1158F ADVNC CARE PLAN TLK DOCD: CPT | Performed by: CLINICAL NURSE SPECIALIST

## 2024-07-03 PROCEDURE — 1170F FXNL STATUS ASSESSED: CPT | Performed by: CLINICAL NURSE SPECIALIST

## 2024-07-03 ASSESSMENT — ENCOUNTER SYMPTOMS
SHORTNESS OF BREATH: 0
PHOTOPHOBIA: 0
POLYDIPSIA: 0
BRUISES/BLEEDS EASILY: 0
NAUSEA: 0
OCCASIONAL FEELINGS OF UNSTEADINESS: 0
WOUND: 0
COUGH: 0
CHEST TIGHTNESS: 0
VOMITING: 0
SLEEP DISTURBANCE: 0
ACTIVITY CHANGE: 0
TROUBLE SWALLOWING: 0
BACK PAIN: 0
APPETITE CHANGE: 0
DIZZINESS: 0
HEADACHES: 0
SEIZURES: 0
JOINT SWELLING: 0
SORE THROAT: 0
ABDOMINAL PAIN: 0
DIARRHEA: 0
CONFUSION: 1
EYE PAIN: 0
ARTHRALGIAS: 0
CONSTIPATION: 0
UNEXPECTED WEIGHT CHANGE: 0
WHEEZING: 0
FLANK PAIN: 0
LOSS OF SENSATION IN FEET: 0
NECK PAIN: 0
FATIGUE: 0
NERVOUS/ANXIOUS: 1
HEMATURIA: 0
DYSURIA: 0
DEPRESSION: 0
PALPITATIONS: 0
CHILLS: 0
BLOOD IN STOOL: 0
FEVER: 0
MYALGIAS: 0

## 2024-07-03 ASSESSMENT — SLU MENTAL STATUS EXAMINATION (SLUMS)
REMEMBER AND REPEAT FIVE WORDS: 0
PROVIDE NAMES OF ANIMALS: 2
QUESTIONS ABOUT STORY: 4
DRAW A CLOCK: 2
PICK OUT TRIANGLE: 2
PATIENT HAS COMPLETED HIGH SCHOOL OR ABOVE: YES
CALCULATE MONEY SPENT AND MONEY LEFT: 3
TOTAL SCORE: 17
WHAT STATE ARE WE IN: 1
WHAT YEAR IS THIS: 1
WHAT DAY OF THE WEEK IS TODAY: 1
BACKWARD DIGIT SPAN: 1

## 2024-07-03 ASSESSMENT — ACTIVITIES OF DAILY LIVING (ADL)
DRESSING: INDEPENDENT
BATHING: INDEPENDENT
DOING_HOUSEWORK: INDEPENDENT
MANAGING_FINANCES: INDEPENDENT
TAKING_MEDICATION: INDEPENDENT
GROCERY_SHOPPING: INDEPENDENT

## 2024-07-03 ASSESSMENT — PATIENT HEALTH QUESTIONNAIRE - PHQ9
1. LITTLE INTEREST OR PLEASURE IN DOING THINGS: NOT AT ALL
SUM OF ALL RESPONSES TO PHQ9 QUESTIONS 1 AND 2: 0
2. FEELING DOWN, DEPRESSED OR HOPELESS: NOT AT ALL

## 2024-07-03 NOTE — PROGRESS NOTES
Subjective   Reason for Visit: Liane Gates is an 78 y.o. female here for a Medicare Wellness visit.     Past Medical, Surgical, and Family History reviewed and updated in chart.    Reviewed all medications by prescribing practitioner or clinical pharmacist (such as prescriptions, OTCs, herbal therapies and supplements) and documented in the medical record.    HPI    Here today as a follow up appointment. Due for Medicare Wellness.     Adjusted Thyroid medication from Endo and states that she is attempting to tolerate.     Diagnosed with COVID in December 2021. Struggling with continued complaints of symptoms, some improvement.      She does have a history of reactive airway disease and has had recurrent bronchospasm after pulmonary infections in the past. Previously responded well to steroid inhaler, she has stopped taking it daily secondary to cost. She does have one on hand in case she gets an illness currently not having any shortness of breath or wheezing.      She does have history of depression but notes that symptoms have been well controlled with Wellbutrin.     Essential tremor symptoms have been controlled with medication.She does not feel that she needs to increase or change medication.      Essential hypertension. Does not tolerate Spironolactone. Developed a rash on medication. Stopped medication and symptoms have started to resolve. Multiple medication allergies for blood pressure medications. Previously was on Hydrochlorothiazide, no longer tolerated and again stopped medication. Has been wearing her compression stockings.      Trouble tolerating her thyroid medication. Has tried Levothyroxine, Synthroid, [place] Thyroid. Developed side effects. Attempted to restart and symptoms returned. Now following with Endo for management.      Complaints of increased neck pain and discomfort. Has been doing at home exercises to try and find relief. Has been using OTC pain relievers and muscle relaxers.  "Tried Baclofen, Ibuprofen, ASA, Acetaminophen, Hot Packs, Diclofenac, TENS. Considering following with Pain Management.       feels that Memory has been worsening over the past several years, noted at last OV. Worsened after COVID. Safe at home with . Worse with her Short Term Memory. SLUMS completed. Had declined Neuro at that time, now interested.  Referral provided, scheduled for December.     Patient Care Team:  MACK Ba-CNS as PCP - General (Family Medicine)  Natalie Sainz MD as 1st Contact (Endocrinology)  Bin Kwan MD as Consulting Physician (Gastroenterology)     Review of Systems   Constitutional:  Negative for activity change, appetite change, chills, fatigue, fever and unexpected weight change.   HENT:  Negative for ear pain, hearing loss, nosebleeds, sore throat, tinnitus and trouble swallowing.    Eyes:  Negative for photophobia, pain and visual disturbance.   Respiratory:  Negative for cough, chest tightness, shortness of breath and wheezing.    Cardiovascular:  Negative for chest pain, palpitations and leg swelling.   Gastrointestinal:  Negative for abdominal pain, blood in stool, constipation, diarrhea, nausea and vomiting.   Endocrine: Negative for cold intolerance, heat intolerance, polydipsia and polyuria.   Genitourinary:  Negative for dysuria, flank pain and hematuria.   Musculoskeletal:  Negative for arthralgias, back pain, joint swelling, myalgias and neck pain.   Skin:  Negative for pallor, rash and wound.   Allergic/Immunologic: Negative for immunocompromised state.   Neurological:  Negative for dizziness, seizures and headaches.   Hematological:  Does not bruise/bleed easily.   Psychiatric/Behavioral:  Positive for confusion. Negative for sleep disturbance. The patient is nervous/anxious.        Objective   Vitals:  /89   Pulse 89   Ht 1.626 m (5' 4\")   SpO2 95%   BMI 33.64 kg/m²       Physical Exam  Vitals and nursing note reviewed. "   Constitutional:       General: She is not in acute distress.     Appearance: Normal appearance.   HENT:      Head: Normocephalic.      Nose: Nose normal.   Eyes:      Conjunctiva/sclera: Conjunctivae normal.   Neck:      Vascular: No carotid bruit.   Cardiovascular:      Rate and Rhythm: Normal rate and regular rhythm.      Pulses: Normal pulses.      Heart sounds: Normal heart sounds.   Pulmonary:      Effort: Pulmonary effort is normal.      Breath sounds: Normal breath sounds.   Abdominal:      General: Bowel sounds are normal.      Palpations: Abdomen is soft.   Musculoskeletal:         General: Normal range of motion.      Cervical back: Normal range of motion.   Skin:     General: Skin is warm and dry.   Neurological:      Mental Status: She is alert and oriented to person, place, and time. Mental status is at baseline.   Psychiatric:         Mood and Affect: Mood normal.         Behavior: Behavior normal.         Assessment/Plan   Problem List Items Addressed This Visit       Essential tremor    HTN (hypertension)    Hypothyroidism    Major depression in remission (CMS-HCC)    REMBERTO on CPAP    Prediabetes    Reactive airway disease ([facility])     Other Visit Diagnoses       Vitamin D deficiency        Osteoarthritis, unspecified osteoarthritis type, unspecified site        Colon cancer screening        Visit for screening mammogram                  Reviewed most recent lab work available with patient.      Obstructive sleep apnea. Last CPAP titration study was done June 2019. Patient doing well on CPAP at night with pressure of 11. Considering following with Sleep Medicine for additional recommendations.   Hypothyroidism. Monitoring with Endocrinology. Medication adjustments.   Prediabetes. A1C 5.8%. Encouraged to be more consistent with diet. Previously did not tolerate metformin secondary to side effects. We'll continue to monitor hemoglobin A1c in 6 months. Supplies ordered to monitor Glucose.   Reactive  airway disease. She has had problems with recurrent bronchospasm after pulmonary infections. Responded previously to Asmanex inhaler but stopped taking daily secondary to cost currently asymptomatic. pulmonary function test in May 2019 was normal.  Obesity: Most recent BMI: 33.64. Lifestyle changes recommended: Diet consisting of low fat foods, lean meats, high fiber, fresh fruits and vegetables. 150 min/ weekly aerobic exercise.  Essential tremor. Symptomatically controlled with primidone.  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  Hypertension. Does not tolerate multiple Hypertension medications. Discontinued Spironolactone and Furosemide. Restarted HCTZ, did not tolerate. Screening coronary artery calcium score was 2.4 in May 2019. Ordered for 2024, September.  Blood pressure uncontrolled at OV today. Pharmacy referral for additional recommendations.   MCI: Slums completed. Referral to Neurology for recommendations.   Medicare Wellness: Routine and age appropriate recommendations discussed with the patient today and patient verbalized understanding of the recommendations.  Questions answered.  Age appropriate immunizations and preventative screenings discussed with the patient and ordered as appropriate. Labs updated and ordered as indicated. Recommend healthy diet and daily exercise to maintain healthy body weight. 5 minutes  were spent screening for depression using PHQ2/PHQ9 as documented in the chart.      DEXA scan was done May 2019. Scheduled for July 2024.   Mammogram: August 2023, negative. September 2024, scheduled.   Colonoscopy: February 2024, plan to repeat in 5 years.   Prevnar done November 2017.  Flu Vaccine: November 2023.   Medicare Wellness: July 2024.   COVID Vaccine: April/June 2021. Discussed Booster.  Discussed updated immunizations.

## 2024-07-09 ENCOUNTER — TELEPHONE (OUTPATIENT)
Dept: PRIMARY CARE | Facility: CLINIC | Age: 79
End: 2024-07-09
Payer: MEDICARE

## 2024-07-09 DIAGNOSIS — F32.5 MAJOR DEPRESSION IN REMISSION (CMS-HCC): ICD-10-CM

## 2024-07-09 RX ORDER — BUPROPION HYDROCHLORIDE 300 MG/1
300 TABLET ORAL DAILY
Qty: 90 TABLET | Refills: 1 | Status: SHIPPED | OUTPATIENT
Start: 2024-07-09 | End: 2025-01-05

## 2024-07-10 ENCOUNTER — APPOINTMENT (OUTPATIENT)
Dept: RADIOLOGY | Facility: CLINIC | Age: 79
End: 2024-07-10
Payer: MEDICARE

## 2024-07-23 ENCOUNTER — TELEPHONE (OUTPATIENT)
Dept: ENDOCRINOLOGY | Facility: CLINIC | Age: 79
End: 2024-07-23
Payer: MEDICARE

## 2024-07-23 NOTE — TELEPHONE ENCOUNTER
(Next appt 10/7/2024    Patient c/o feeling nauseated, diarrhea, with some anxiety, shaking after taking the tirosint, she noticed the upset stomach maybe 1 month after starting the tirosint, and fatigue right away with starting medication.

## 2024-07-29 DIAGNOSIS — E03.9 ACQUIRED HYPOTHYROIDISM: Primary | ICD-10-CM

## 2024-07-29 NOTE — TELEPHONE ENCOUNTER
Patient called to check status on message, patient state she skipped one day because she can no longer stand the side effects

## 2024-08-13 ENCOUNTER — LAB (OUTPATIENT)
Dept: LAB | Facility: LAB | Age: 79
End: 2024-08-13
Payer: MEDICARE

## 2024-08-13 ENCOUNTER — HOSPITAL ENCOUNTER (OUTPATIENT)
Dept: RADIOLOGY | Facility: CLINIC | Age: 79
Discharge: HOME | End: 2024-08-13
Payer: MEDICARE

## 2024-08-13 DIAGNOSIS — Z78.0 POSTMENOPAUSAL: ICD-10-CM

## 2024-08-13 DIAGNOSIS — E03.9 ACQUIRED HYPOTHYROIDISM: ICD-10-CM

## 2024-08-13 LAB
T4 FREE SERPL-MCNC: 1.96 NG/DL (ref 0.61–1.12)
TSH SERPL-ACNC: 4.08 MIU/L (ref 0.44–3.98)

## 2024-08-13 PROCEDURE — 84439 ASSAY OF FREE THYROXINE: CPT

## 2024-08-13 PROCEDURE — 77080 DXA BONE DENSITY AXIAL: CPT

## 2024-08-13 PROCEDURE — 36415 COLL VENOUS BLD VENIPUNCTURE: CPT

## 2024-08-13 PROCEDURE — 84443 ASSAY THYROID STIM HORMONE: CPT

## 2024-08-13 ASSESSMENT — LIFESTYLE VARIABLES
CURRENT_SMOKER: N
3_OR_MORE_DRINKS_PER_DAY: N

## 2024-08-14 ENCOUNTER — TELEPHONE (OUTPATIENT)
Dept: PRIMARY CARE | Facility: CLINIC | Age: 79
End: 2024-08-14
Payer: MEDICARE

## 2024-08-14 NOTE — TELEPHONE ENCOUNTER
----- Message from Floresita Tellez sent at 8/13/2024 10:16 PM EDT -----  Please let patient know that Bone Density indicates Osteopenia. Encourage weight bearing exercises. Calcium intake with Vitamin D.

## 2024-08-23 ENCOUNTER — TELEPHONE (OUTPATIENT)
Dept: ENDOCRINOLOGY | Facility: CLINIC | Age: 79
End: 2024-08-23
Payer: MEDICARE

## 2024-08-29 NOTE — TELEPHONE ENCOUNTER
Patient called to follow up on request for lab results. She states she has called several times last week with no response. She expressed great frustration that she has not received an update yet. Please advise

## 2024-09-03 ENCOUNTER — APPOINTMENT (OUTPATIENT)
Dept: PRIMARY CARE | Facility: CLINIC | Age: 79
End: 2024-09-03
Payer: MEDICARE

## 2024-09-04 ENCOUNTER — APPOINTMENT (OUTPATIENT)
Dept: RADIOLOGY | Facility: HOSPITAL | Age: 79
End: 2024-09-04
Payer: MEDICARE

## 2024-09-11 ENCOUNTER — APPOINTMENT (OUTPATIENT)
Dept: RADIOLOGY | Facility: HOSPITAL | Age: 79
End: 2024-09-11
Payer: MEDICARE

## 2024-09-17 NOTE — TELEPHONE ENCOUNTER
Patient states she has reached out to our office to inform Emeli Hector of recent side effects with tirosint. She states she has stopped taking medication as of 8/31/24. She c/o full body pain, nausea, brain fog, and stomach pain. She does not plan on starting tirosint again. Please advise.     She is concern that if something else is prescribed that she will have the same reaction and/or she will not be able to afford it since she has no drug coverage.

## 2024-09-28 ENCOUNTER — HOSPITAL ENCOUNTER (OUTPATIENT)
Dept: RADIOLOGY | Facility: HOSPITAL | Age: 79
Discharge: HOME | End: 2024-09-28
Payer: MEDICARE

## 2024-09-28 DIAGNOSIS — I10 PRIMARY HYPERTENSION: ICD-10-CM

## 2024-09-28 DIAGNOSIS — R73.03 PREDIABETES: ICD-10-CM

## 2024-09-28 PROCEDURE — 75571 CT HRT W/O DYE W/CA TEST: CPT

## 2024-10-04 ENCOUNTER — TELEPHONE (OUTPATIENT)
Dept: PRIMARY CARE | Facility: CLINIC | Age: 79
End: 2024-10-04
Payer: MEDICARE

## 2024-10-04 NOTE — TELEPHONE ENCOUNTER
----- Message from Floresita Tellez sent at 10/3/2024  9:21 PM EDT -----  Please call patient with results of CT Cardiac Scoring. Remains low risk, increased to 11.3,  but still minimal. Most recent LDL at goal. Has an order for updated lab work to be done prior to our follow up appointment. No other changes noted. Can discuss further at follow up appointment next month. Thank you!

## 2024-10-07 ENCOUNTER — APPOINTMENT (OUTPATIENT)
Dept: ENDOCRINOLOGY | Facility: CLINIC | Age: 79
End: 2024-10-07
Payer: MEDICARE

## 2024-10-10 ENCOUNTER — APPOINTMENT (OUTPATIENT)
Dept: RADIOLOGY | Facility: CLINIC | Age: 79
End: 2024-10-10
Payer: MEDICARE

## 2024-10-14 ENCOUNTER — APPOINTMENT (OUTPATIENT)
Dept: RADIOLOGY | Facility: HOSPITAL | Age: 79
End: 2024-10-14
Payer: MEDICARE

## 2024-10-18 ENCOUNTER — APPOINTMENT (OUTPATIENT)
Dept: RADIOLOGY | Facility: HOSPITAL | Age: 79
End: 2024-10-18
Payer: MEDICARE

## 2024-10-25 ENCOUNTER — APPOINTMENT (OUTPATIENT)
Dept: RADIOLOGY | Facility: HOSPITAL | Age: 79
End: 2024-10-25
Payer: MEDICARE

## 2024-11-01 ENCOUNTER — APPOINTMENT (OUTPATIENT)
Dept: RADIOLOGY | Facility: HOSPITAL | Age: 79
End: 2024-11-01
Payer: MEDICARE

## 2024-11-11 ENCOUNTER — APPOINTMENT (OUTPATIENT)
Dept: RADIOLOGY | Facility: HOSPITAL | Age: 79
End: 2024-11-11
Payer: MEDICARE

## 2024-11-12 ENCOUNTER — HOSPITAL ENCOUNTER (OUTPATIENT)
Dept: RADIOLOGY | Facility: HOSPITAL | Age: 79
Discharge: HOME | End: 2024-11-12
Payer: MEDICARE

## 2024-11-12 VITALS — BODY MASS INDEX: 33.46 KG/M2 | WEIGHT: 196 LBS | HEIGHT: 64 IN

## 2024-11-12 DIAGNOSIS — Z12.31 VISIT FOR SCREENING MAMMOGRAM: ICD-10-CM

## 2024-11-12 PROCEDURE — 77063 BREAST TOMOSYNTHESIS BI: CPT

## 2024-11-12 PROCEDURE — 77067 SCR MAMMO BI INCL CAD: CPT

## 2024-11-13 ENCOUNTER — TELEPHONE (OUTPATIENT)
Dept: PRIMARY CARE | Facility: CLINIC | Age: 79
End: 2024-11-13
Payer: MEDICARE

## 2024-11-13 NOTE — TELEPHONE ENCOUNTER
----- Message from Floresita Tellez sent at 11/12/2024  7:49 PM EST -----  Please let patient know that her Mammogram is normal. Thank you!

## 2024-11-18 ENCOUNTER — LAB (OUTPATIENT)
Dept: LAB | Facility: LAB | Age: 79
End: 2024-11-18
Payer: MEDICARE

## 2024-11-18 DIAGNOSIS — E03.9 ACQUIRED HYPOTHYROIDISM: ICD-10-CM

## 2024-11-18 DIAGNOSIS — E55.9 VITAMIN D DEFICIENCY: ICD-10-CM

## 2024-11-18 DIAGNOSIS — I10 PRIMARY HYPERTENSION: ICD-10-CM

## 2024-11-18 DIAGNOSIS — R73.03 PREDIABETES: ICD-10-CM

## 2024-11-18 LAB
25(OH)D3 SERPL-MCNC: 73 NG/ML (ref 30–100)
ALBUMIN SERPL BCP-MCNC: 4.2 G/DL (ref 3.4–5)
ALP SERPL-CCNC: 67 U/L (ref 33–136)
ALT SERPL W P-5'-P-CCNC: 25 U/L (ref 7–45)
ANION GAP SERPL CALC-SCNC: 10 MMOL/L (ref 10–20)
AST SERPL W P-5'-P-CCNC: 20 U/L (ref 9–39)
BILIRUB SERPL-MCNC: 0.5 MG/DL (ref 0–1.2)
BUN SERPL-MCNC: 24 MG/DL (ref 6–23)
CALCIUM SERPL-MCNC: 9.1 MG/DL (ref 8.6–10.3)
CHLORIDE SERPL-SCNC: 100 MMOL/L (ref 98–107)
CHOLEST SERPL-MCNC: 175 MG/DL (ref 0–199)
CHOLESTEROL/HDL RATIO: 2.4
CO2 SERPL-SCNC: 32 MMOL/L (ref 21–32)
CREAT SERPL-MCNC: 0.78 MG/DL (ref 0.5–1.05)
EGFRCR SERPLBLD CKD-EPI 2021: 78 ML/MIN/1.73M*2
ERYTHROCYTE [DISTWIDTH] IN BLOOD BY AUTOMATED COUNT: 13.5 % (ref 11.5–14.5)
GLUCOSE SERPL-MCNC: 117 MG/DL (ref 74–99)
HCT VFR BLD AUTO: 46.8 % (ref 36–46)
HCV AB SER QL: NONREACTIVE
HDLC SERPL-MCNC: 72.5 MG/DL
HGB BLD-MCNC: 15.6 G/DL (ref 12–16)
LDLC SERPL CALC-MCNC: 91 MG/DL
MCH RBC QN AUTO: 33 PG (ref 26–34)
MCHC RBC AUTO-ENTMCNC: 33.3 G/DL (ref 32–36)
MCV RBC AUTO: 99 FL (ref 80–100)
NON HDL CHOLESTEROL: 103 MG/DL (ref 0–149)
NRBC BLD-RTO: 0 /100 WBCS (ref 0–0)
PLATELET # BLD AUTO: 234 X10*3/UL (ref 150–450)
POTASSIUM SERPL-SCNC: 4.1 MMOL/L (ref 3.5–5.3)
PROT SERPL-MCNC: 6.5 G/DL (ref 6.4–8.2)
RBC # BLD AUTO: 4.73 X10*6/UL (ref 4–5.2)
SODIUM SERPL-SCNC: 138 MMOL/L (ref 136–145)
TRIGL SERPL-MCNC: 56 MG/DL (ref 0–149)
TSH SERPL-ACNC: 3.78 MIU/L (ref 0.44–3.98)
VIT B12 SERPL-MCNC: 1304 PG/ML (ref 211–911)
VLDL: 11 MG/DL (ref 0–40)
WBC # BLD AUTO: 4.5 X10*3/UL (ref 4.4–11.3)

## 2024-11-18 PROCEDURE — 80053 COMPREHEN METABOLIC PANEL: CPT

## 2024-11-18 PROCEDURE — 85027 COMPLETE CBC AUTOMATED: CPT

## 2024-11-18 PROCEDURE — 82306 VITAMIN D 25 HYDROXY: CPT

## 2024-11-18 PROCEDURE — 82607 VITAMIN B-12: CPT

## 2024-11-18 PROCEDURE — 84443 ASSAY THYROID STIM HORMONE: CPT

## 2024-11-18 PROCEDURE — 80061 LIPID PANEL: CPT

## 2024-11-18 PROCEDURE — 36415 COLL VENOUS BLD VENIPUNCTURE: CPT

## 2024-11-18 PROCEDURE — 86803 HEPATITIS C AB TEST: CPT

## 2024-11-26 ENCOUNTER — APPOINTMENT (OUTPATIENT)
Dept: PRIMARY CARE | Facility: CLINIC | Age: 79
End: 2024-11-26
Payer: MEDICARE

## 2024-11-26 VITALS
OXYGEN SATURATION: 95 % | BODY MASS INDEX: 33.87 KG/M2 | DIASTOLIC BLOOD PRESSURE: 85 MMHG | HEART RATE: 91 BPM | WEIGHT: 198.4 LBS | HEIGHT: 64 IN | SYSTOLIC BLOOD PRESSURE: 135 MMHG

## 2024-11-26 DIAGNOSIS — G47.33 OSA ON CPAP: ICD-10-CM

## 2024-11-26 DIAGNOSIS — G31.84 MCI (MILD COGNITIVE IMPAIRMENT): ICD-10-CM

## 2024-11-26 DIAGNOSIS — E55.9 VITAMIN D DEFICIENCY: ICD-10-CM

## 2024-11-26 DIAGNOSIS — J45.909 REACTIVE AIRWAY DISEASE WITHOUT COMPLICATION, UNSPECIFIED ASTHMA SEVERITY, UNSPECIFIED WHETHER PERSISTENT (HHS-HCC): ICD-10-CM

## 2024-11-26 DIAGNOSIS — E03.9 ACQUIRED HYPOTHYROIDISM: ICD-10-CM

## 2024-11-26 DIAGNOSIS — R73.03 PREDIABETES: Primary | ICD-10-CM

## 2024-11-26 DIAGNOSIS — I10 PRIMARY HYPERTENSION: ICD-10-CM

## 2024-11-26 LAB — POC HEMOGLOBIN A1C: 5.7 % (ref 4.2–6.5)

## 2024-11-26 PROCEDURE — 3075F SYST BP GE 130 - 139MM HG: CPT | Performed by: CLINICAL NURSE SPECIALIST

## 2024-11-26 PROCEDURE — 83036 HEMOGLOBIN GLYCOSYLATED A1C: CPT | Performed by: CLINICAL NURSE SPECIALIST

## 2024-11-26 PROCEDURE — 1159F MED LIST DOCD IN RCRD: CPT | Performed by: CLINICAL NURSE SPECIALIST

## 2024-11-26 PROCEDURE — 99214 OFFICE O/P EST MOD 30 MIN: CPT | Performed by: CLINICAL NURSE SPECIALIST

## 2024-11-26 PROCEDURE — 90662 IIV NO PRSV INCREASED AG IM: CPT | Performed by: CLINICAL NURSE SPECIALIST

## 2024-11-26 PROCEDURE — 1123F ACP DISCUSS/DSCN MKR DOCD: CPT | Performed by: CLINICAL NURSE SPECIALIST

## 2024-11-26 PROCEDURE — 1036F TOBACCO NON-USER: CPT | Performed by: CLINICAL NURSE SPECIALIST

## 2024-11-26 PROCEDURE — G0008 ADMIN INFLUENZA VIRUS VAC: HCPCS | Performed by: CLINICAL NURSE SPECIALIST

## 2024-11-26 PROCEDURE — 1160F RVW MEDS BY RX/DR IN RCRD: CPT | Performed by: CLINICAL NURSE SPECIALIST

## 2024-11-26 PROCEDURE — 3079F DIAST BP 80-89 MM HG: CPT | Performed by: CLINICAL NURSE SPECIALIST

## 2024-11-26 PROCEDURE — 1157F ADVNC CARE PLAN IN RCRD: CPT | Performed by: CLINICAL NURSE SPECIALIST

## 2024-11-26 ASSESSMENT — ENCOUNTER SYMPTOMS
SEIZURES: 0
CONSTIPATION: 0
ABDOMINAL PAIN: 0
MYALGIAS: 0
DIZZINESS: 0
JOINT SWELLING: 0
TROUBLE SWALLOWING: 0
COUGH: 0
SLEEP DISTURBANCE: 0
SHORTNESS OF BREATH: 0
CHEST TIGHTNESS: 0
DYSURIA: 0
LOSS OF SENSATION IN FEET: 0
NECK PAIN: 0
NAUSEA: 0
DEPRESSION: 0
OCCASIONAL FEELINGS OF UNSTEADINESS: 0
BACK PAIN: 0
APPETITE CHANGE: 0
WHEEZING: 0
FLANK PAIN: 0
PHOTOPHOBIA: 0
UNEXPECTED WEIGHT CHANGE: 0
PALPITATIONS: 0
BRUISES/BLEEDS EASILY: 0
BLOOD IN STOOL: 0
VOMITING: 0
CHILLS: 0
FEVER: 0
ACTIVITY CHANGE: 0
SORE THROAT: 0
EYE PAIN: 0
HEADACHES: 0
CONFUSION: 0
POLYDIPSIA: 0
WOUND: 0
HEMATURIA: 0
DIARRHEA: 0
FATIGUE: 0
ARTHRALGIAS: 1

## 2024-11-26 NOTE — PROGRESS NOTES
Subjective   Patient ID: Liane Gates is a 79 y.o. female who presents for Follow-up (Blood work results /).  HPI      Here today as a follow up appointment.      Adjusted Thyroid medication from Endo and states that she was unable to tolerate. Tried to reach out to Endo but was not getting feedback or recommendations. Ultimately stopped medication due to inability to tolerate.      She does have a history of reactive airway disease and has had recurrent bronchospasm after pulmonary infections in the past. Previously responded well to steroid inhaler, she has stopped taking it daily secondary to cost. She does have one on hand in case she gets an illness currently not having any shortness of breath or wheezing.      She does have history of depression but notes that symptoms have been well controlled with Wellbutrin.      Essential tremor symptoms have been controlled with medication. She does not feel that she needs to increase or change medication.      Essential hypertension. Does not tolerate Spironolactone. Developed a rash on medication. Stopped medication and symptoms have started to resolve. Multiple medication allergies for blood pressure medications. Previously was on Hydrochlorothiazide, no longer tolerated and again stopped medication. Has been wearing her compression stockings.      Trouble tolerating her thyroid medication. Has tried Levothyroxine, Synthroid, [place] Thyroid. Developed side effects. Attempted to restart and symptoms returned. Now following with Endo for management but states that she is not getting consistent feedback and ultimately stopped medication.      Complaints of increased neck pain and discomfort. Has been doing at home exercises to try and find relief. Has been using OTC pain relievers and muscle relaxers. Tried Baclofen, Ibuprofen, ASA, Acetaminophen, Hot Packs, Diclofenac, TENS. Considering following with Pain Management.       feels that Memory has been  worsening over the past several years, noted at last OV. Worsened after COVID. Safe at home with . Worse with her Short Term Memory. SLUMS completed at last visit. Had declined Neuro at that time, now interested.  Referral provided, scheduled for December.     Review of Systems   Constitutional:  Negative for activity change, appetite change, chills, fatigue, fever and unexpected weight change.   HENT:  Negative for ear pain, hearing loss, nosebleeds, sore throat, tinnitus and trouble swallowing.    Eyes:  Negative for photophobia, pain and visual disturbance.   Respiratory:  Negative for cough, chest tightness, shortness of breath and wheezing.    Cardiovascular:  Negative for chest pain, palpitations and leg swelling.   Gastrointestinal:  Negative for abdominal pain, blood in stool, constipation, diarrhea, nausea and vomiting.   Endocrine: Negative for cold intolerance, heat intolerance, polydipsia and polyuria.   Genitourinary:  Negative for dysuria, flank pain and hematuria.   Musculoskeletal:  Positive for arthralgias. Negative for back pain, joint swelling, myalgias and neck pain.   Skin:  Negative for pallor, rash and wound.   Allergic/Immunologic: Negative for immunocompromised state.   Neurological:  Negative for dizziness, seizures and headaches.   Hematological:  Does not bruise/bleed easily.   Psychiatric/Behavioral:  Negative for confusion and sleep disturbance.        Objective   Physical Exam  Vitals and nursing note reviewed.   Constitutional:       General: She is not in acute distress.     Appearance: Normal appearance.   HENT:      Head: Normocephalic.      Nose: Nose normal.   Eyes:      Conjunctiva/sclera: Conjunctivae normal.   Neck:      Vascular: No carotid bruit.   Cardiovascular:      Rate and Rhythm: Normal rate and regular rhythm.      Pulses: Normal pulses.      Heart sounds: Normal heart sounds.   Pulmonary:      Effort: Pulmonary effort is normal.      Breath sounds: Normal  breath sounds.   Abdominal:      General: Bowel sounds are normal.      Palpations: Abdomen is soft.   Musculoskeletal:         General: Normal range of motion.      Cervical back: Normal range of motion.   Skin:     General: Skin is warm and dry.   Neurological:      Mental Status: She is alert and oriented to person, place, and time. Mental status is at baseline.   Psychiatric:         Mood and Affect: Mood normal.         Behavior: Behavior normal.         Assessment/Plan          Reviewed most recent lab work available with patient.      Obstructive sleep apnea. Last CPAP titration study was done June 2019. Patient doing well on CPAP at night with pressure of 11. Considering following with Sleep Medicine for additional recommendations. Considering Inspire based on struggling with her current CPAP.  Hypothyroidism. Monitoring with Endocrinology. Medication adjustments. Difficulty tolerating and stopped medication. Will reach out to Endo for additional recommendations.   Prediabetes. A1C 5.7%. Encouraged to be more consistent with diet. Previously did not tolerate metformin secondary to side effects. We'll continue to monitor hemoglobin A1c in 6 months. Supplies ordered to monitor Glucose.   Reactive airway disease. She has had problems with recurrent bronchospasm after pulmonary infections. Responded previously to Asmanex inhaler but stopped taking daily secondary to cost currently asymptomatic. pulmonary function test in May 2019 was normal.  Obesity: BMI: 34.06. Lifestyle changes recommended: Diet consisting of low fat foods, lean meats, high fiber, fresh fruits and vegetables. 150 min/ weekly aerobic exercise.  Essential tremor. Symptomatically controlled with primidone.  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  Hypertension. Does not tolerate multiple Hypertension medications. Discontinued Spironolactone and Furosemide. Restarted HCTZ, did not tolerate. Screening coronary artery  calcium score was Ordered for 2024, September.  Blood pressure uncontrolled at OV today. Pharmacy referral for additional recommendations provided previously.   MCI: Slums completed at previous visit. Referral to Neurology for recommendations. Scheduled for December.     DEXA: August 2024, Osteopenia.   Mammogram: November 2024, negative.  Colonoscopy: February 2024, plan to repeat in 5 years.   Prevnar done November 2017.  Flu Vaccine: November 2024.   Medicare Wellness: July 2024.   COVID Vaccine: June 2021. Discussed Booster.  Discussed updated immunizations.     Floresita Tellez, MACK-CNS 11/26/24 1:03 PM

## 2024-12-10 ENCOUNTER — OFFICE VISIT (OUTPATIENT)
Dept: NEUROLOGY | Facility: HOSPITAL | Age: 79
End: 2024-12-10
Payer: MEDICARE

## 2024-12-10 VITALS
DIASTOLIC BLOOD PRESSURE: 82 MMHG | HEART RATE: 89 BPM | BODY MASS INDEX: 34.31 KG/M2 | WEIGHT: 199.9 LBS | SYSTOLIC BLOOD PRESSURE: 151 MMHG

## 2024-12-10 DIAGNOSIS — G25.0 ESSENTIAL TREMOR: ICD-10-CM

## 2024-12-10 DIAGNOSIS — G31.84 MCI (MILD COGNITIVE IMPAIRMENT): Primary | ICD-10-CM

## 2024-12-10 PROCEDURE — 3079F DIAST BP 80-89 MM HG: CPT | Performed by: PSYCHIATRY & NEUROLOGY

## 2024-12-10 PROCEDURE — 1125F AMNT PAIN NOTED PAIN PRSNT: CPT | Performed by: PSYCHIATRY & NEUROLOGY

## 2024-12-10 PROCEDURE — 1157F ADVNC CARE PLAN IN RCRD: CPT | Performed by: PSYCHIATRY & NEUROLOGY

## 2024-12-10 PROCEDURE — G2211 COMPLEX E/M VISIT ADD ON: HCPCS | Performed by: PSYCHIATRY & NEUROLOGY

## 2024-12-10 PROCEDURE — 99215 OFFICE O/P EST HI 40 MIN: CPT | Performed by: PSYCHIATRY & NEUROLOGY

## 2024-12-10 PROCEDURE — 1159F MED LIST DOCD IN RCRD: CPT | Performed by: PSYCHIATRY & NEUROLOGY

## 2024-12-10 PROCEDURE — 99205 OFFICE O/P NEW HI 60 MIN: CPT | Performed by: PSYCHIATRY & NEUROLOGY

## 2024-12-10 PROCEDURE — 3077F SYST BP >= 140 MM HG: CPT | Performed by: PSYCHIATRY & NEUROLOGY

## 2024-12-10 PROCEDURE — 1160F RVW MEDS BY RX/DR IN RCRD: CPT | Performed by: PSYCHIATRY & NEUROLOGY

## 2024-12-10 PROCEDURE — 1123F ACP DISCUSS/DSCN MKR DOCD: CPT | Performed by: PSYCHIATRY & NEUROLOGY

## 2024-12-10 RX ORDER — VITAMIN E MIXED 400 UNIT
CAPSULE ORAL DAILY
COMMUNITY

## 2024-12-10 RX ORDER — CALCIUM CARBONATE 300MG(750)
TABLET,CHEWABLE ORAL DAILY
COMMUNITY

## 2024-12-10 RX ORDER — PRIMIDONE 50 MG/1
50 TABLET ORAL 3 TIMES DAILY
Qty: 270 TABLET | Refills: 1 | Status: SHIPPED | OUTPATIENT
Start: 2024-12-10 | End: 2025-06-08

## 2024-12-10 ASSESSMENT — ENCOUNTER SYMPTOMS
DEPRESSION: 0
OCCASIONAL FEELINGS OF UNSTEADINESS: 0
LOSS OF SENSATION IN FEET: 0

## 2024-12-10 ASSESSMENT — PAIN SCALES - GENERAL: PAINLEVEL_OUTOF10: 6

## 2024-12-10 NOTE — PROGRESS NOTES
In-clinic visit    Visit type: provider referral: Floresita Tellez    PCP: Floresita Tellez, APRN-CNS.    Subjective     Liane Gates is a 79 y.o. year old right handed female who presents with Memory Loss (Referred by Floresita Calabrese).    Patient is accompanied by spouse.     HPI    Trouble with memory, short term>>long term memory. x2-3 years.  Worsening. Lives with spouse. Cooks. Not leaving stove on or faucet on. Hasn't been driving, due to leg cramping.     Has had COVID in 2022. Has been mostly at home due to pain issues.    Doesn't go to occasions/celebrations. Feels it is a huge effort to get out of the house due to her fibromyalgia.    No depression. No anxiety.     Has not seen anybody for memory except PCP.    No stroke or sz hx.    SLUMS 17/30 on 12/10/24    REMBERTO on CPAP. Using without issues. Hba1c 5.8.    11/18/24 B12 ok, TSH ok    Has ET, on primidone. Wants me to comment on that as well. Apparently has never been on beta blocker for ET--? Why.    On primidone 50mg bid  On baclofen 10mg once or bid  On bupropion XL 300mg daily.    No smoking. No alcohol. No street drug use.    Review of Systems   Constitutional:  Negative for appetite change, chills and fever.   HENT:  Negative for ear pain and nosebleeds.    Eyes:  Negative for discharge and itching.   Respiratory:  Negative for choking and chest tightness.    Cardiovascular:  Negative for chest pain and palpitations.   Gastrointestinal:  Negative for abdominal distention, abdominal pain and nausea.   Endocrine: Negative for cold intolerance and heat intolerance.   Genitourinary:  Negative for difficulty urinating and dysuria.   Musculoskeletal:  Negative for gait problem and myalgias.   Neurological:  Negative for dizziness, seizures and numbness.     Patient Active Problem List   Diagnosis    Essential tremor    Fibromyalgia    Generalized weakness    HTN (hypertension)    Hypothyroidism    Low vitamin D level    Major depression in remission (CMS-[facility])     REMBERTO on CPAP    Pneumonia    Prediabetes    Reactive airway disease ([facility])    Medicare annual wellness visit, subsequent    Hyperglycemia    MCI (mild cognitive impairment)       Past Medical History:   Diagnosis Date    Acute bronchospasm 12/20/2021    Bronchospasm    Anxiety     Arthritis     Body mass index (BMI) 37.0-37.9, adult 10/28/2021    Body mass index (BMI) of 37.0 to 37.9 in adult    COVID-19 05/17/2022    COVID-19 virus infection    Depression     Elevated C-reactive protein (CRP)     CRP elevated    Encounter for follow-up examination after completed treatment for conditions other than malignant neoplasm 01/11/2022    Hospital discharge follow-up    Fibromyalgia     Hyperlipidemia     Hypertension     Morbid (severe) obesity due to excess calories (Multi) 04/29/2021    Class 2 severe obesity due to excess calories with serious comorbidity and body mass index (BMI) of 37.0 to 37.9 in adult    Morbid (severe) obesity due to excess calories (Multi) 10/28/2021    Class 2 severe obesity with serious comorbidity and body mass index (BMI) of 37.0 to 37.9 in adult    Personal history of other diseases of the musculoskeletal system and connective tissue     History of osteopenia    Personal history of other specified conditions     History of impaired glucose tolerance    Personal history of other specified conditions 01/07/2022    History of diarrhea    Sleep apnea     Thyroid disease      Past Surgical History:   Procedure Laterality Date    HYSTERECTOMY      OTHER SURGICAL HISTORY  02/24/2020    Hysterectomy    OTHER SURGICAL HISTORY  02/24/2020    Vagotomy    OTHER SURGICAL HISTORY  02/24/2020    Cholecystectomy    OTHER SURGICAL HISTORY  02/24/2020    Pyloroplasty     Social History     Tobacco Use    Smoking status: Never    Smokeless tobacco: Never   Substance Use Topics    Alcohol use: Never     family history includes Breast cancer in her mother; Cancer in her brother and mother; Heart disease in  her brother and mother; Stroke in her father.    Allergies   Allergen Reactions    Amlodipine Swelling     Mouth     Lisinopril Swelling     Mouth    Carvedilol Hives, Itching and Swelling    Robbins Thyroid [Thyroid (Pork)] Other     Malaise     Furosemide Rash    Hydrochlorothiazide Rash    Metformin Hives and Rash    Penicillins Hives and Rash    Spironolactone Rash    Synthroid [Levothyroxine] Rash       Current Outpatient Medications:     ascorbic acid (Vitamin C) 500 mg tablet, Take 1 tablet (500 mg) by mouth once daily., Disp: , Rfl:     baclofen (Lioresal) 10 mg tablet, Take 1 tablet (10 mg) by mouth 3 times a day. TAKING PRN, Disp: , Rfl:     buPROPion XL (Wellbutrin XL) 300 mg 24 hr tablet, Take 1 tablet (300 mg) by mouth once daily., Disp: 90 tablet, Rfl: 1    cyanocobalamin (Vitamin B-12) 1,000 mcg tablet, Take 1 tablet (1,000 mcg) by mouth once daily., Disp: , Rfl:     ergocalciferol (Vitamin D-2) 50 MCG (2000 UT) capsule capsule, Take 1 capsule (50 mcg) by mouth once daily., Disp: , Rfl:     fish oil concentrate (Omega-3) 120-180 mg capsule, Take 1 capsule (1 g) by mouth once daily., Disp: , Rfl:     multivitamin tablet, Take 1 tablet by mouth once daily., Disp: , Rfl:     primidone (Mysoline) 50 mg tablet, Take 1 tablet (50 mg) by mouth 3 times a day., Disp: 270 tablet, Rfl: 1    Blood glucose monitoring meter kit (FreeStyle Lite Meter) kit, 1 each once daily. Give what is covered by insurance Patient to test blood sugar daily (Patient not taking: Reported on 12/10/2024), Disp: 1 each, Rfl: 0    blood sugar diagnostic (FreeStyle Lite Strips) strip, 1 strip once daily. Give what is covered by insurance (Patient not taking: Reported on 12/10/2024), Disp: 100 strip, Rfl: 11    FreeStyle Lancets 28 gauge, Use as instructed give what is covered by insurance (Patient not taking: Reported on 12/10/2024), Disp: 100 each, Rfl: 11    Objective     /82   Pulse 89   Wt 90.7 kg (199 lb 14.4 oz)   BMI 34.31  kg/m²     SLUMS 17/30 on 12/10/24    (+) some head tremor    Awake, alert, oriented x3, in no distress  Well-nourished, ambulatory independently  No leg edema    Mental status exam as above, conversant   Fair fund of knowledge  Recent/remote memory fair  Fair attention span  Pupils round reactive to light, 3-4 mm, (-) RAPD   Fundoscopic examination was attempted but fundus was not visualized bilaterally   Full EOMs intact, no nystagmus, no ptosis   V1 to V3 sensation is intact   No facial droop   Hearing grossly intact   No dysarthria  Good shoulder shrug bilaterally   Tongue is midline     Motor strength is 5/5 on all extremities, tone/bulk normal   Some UE tremors  Reflexes trace on all 4 extremities, downgoing toes bilaterally   Sensation is intact to light touch, vibration on all 4 extremities   Finger to nose test intact bilaterally   Negative Romberg sign   Normal gait       Lab Results   Component Value Date    WBC 4.5 11/18/2024    RBC 4.73 11/18/2024    HGB 15.6 11/18/2024    HCT 46.8 (H) 11/18/2024     11/18/2024     11/18/2024    K 4.1 11/18/2024     11/18/2024    BUN 24 (H) 11/18/2024    CREATININE 0.78 11/18/2024    EGFR 78 11/18/2024    CALCIUM 9.1 11/18/2024    ALKPHOS 67 11/18/2024    AST 20 11/18/2024    ALT 25 11/18/2024    MG 2.19 06/09/2021    EIJPURDY59 1,304 (H) 11/18/2024    VITD25 73 11/18/2024    HGBA1C 5.7 11/26/2024    LDLCALC 91 11/18/2024    CHOL 175 11/18/2024    HDL 72.5 11/18/2024    TRIG 56 11/18/2024    TSH 3.78 11/18/2024        Assessment/Plan     Mild cognitive impairment with memory loss  Likely MCI  MALVIN 17/30 on 12/10/24  Discussed, also on multiple meds (baclofen, primidone, bupropion) which may be negatively affecting cognition as well due to poss SE    2.   Essential tremor  Stated hx ET  On primidone 50mg tid  Never was on beta blocker--? Why  Discussed poss switch to propranolol, poss SE  Discussed, unfortunately, outside of these two, other  medication(s) do not work well for this condition    3. REMBERTO on CPAP  Being seen elsewhere  Says using    Plans:  Do MRI brain wo  ?? Use propranolol    Rtc after testing    All questions were answered.  Pt knows how to contact my office in case pt has any questions or concerns.    Andres Everett MD

## 2024-12-10 NOTE — LETTER
December 11, 2024     MACK Ba-SHANNA  [address]  [place] [address]    Patient: Liane Gates   YOB: 1945   Date of Visit: 12/10/2024       Dear MACK García-SHANNA:    Thank you for referring Liane Gates to me for evaluation. Below are my notes for this consultation.  If you have questions, please do not hesitate to call me. I look forward to following your patient along with you.       Sincerely,     Andres Everett MD      CC: No Recipients  ______________________________________________________________________________________    In-clinic visit    Visit type: provider referral: Floresita Tellez    PCP: ROBERT Ba.    Subjective     Liane Gates is a 79 y.o. year old right handed female who presents with Memory Loss (Referred by Floresita Calabrese).    Patient is accompanied by spouse.     HPI    Trouble with memory, short term>>long term memory. x2-3 years.  Worsening. Lives with spouse. Cooks. Not leaving stove on or faucet on. Hasn't been driving, due to leg cramping.     Has had COVID in 2022. Has been mostly at home due to pain issues.    Doesn't go to occasions/celebrations. Feels it is a huge effort to get out of the house due to her fibromyalgia.    No depression. No anxiety.     Has not seen anybody for memory except PCP.    No stroke or sz hx.    SLUMS 17/30 on 12/10/24    REMBERTO on CPAP. Using without issues. Hba1c 5.8.    11/18/24 B12 ok, TSH ok    Has ET, on primidone. Wants me to comment on that as well. Apparently has never been on beta blocker for ET--? Why.    On primidone 50mg bid  On baclofen 10mg once or bid  On bupropion XL 300mg daily.    No smoking. No alcohol. No street drug use.    Review of Systems   Constitutional:  Negative for appetite change, chills and fever.   HENT:  Negative for ear pain and nosebleeds.    Eyes:  Negative for discharge and itching.   Respiratory:  Negative for choking and  chest tightness.    Cardiovascular:  Negative for chest pain and palpitations.   Gastrointestinal:  Negative for abdominal distention, abdominal pain and nausea.   Endocrine: Negative for cold intolerance and heat intolerance.   Genitourinary:  Negative for difficulty urinating and dysuria.   Musculoskeletal:  Negative for gait problem and myalgias.   Neurological:  Negative for dizziness, seizures and numbness.     Patient Active Problem List   Diagnosis   • Essential tremor   • Fibromyalgia   • Generalized weakness   • HTN (hypertension)   • Hypothyroidism   • Low vitamin D level   • Major depression in remission (CMS-[facility])   • REMBERTO on CPAP   • Pneumonia   • Prediabetes   • Reactive airway disease ([facility])   • Medicare annual wellness visit, subsequent   • Hyperglycemia   • MCI (mild cognitive impairment)       Past Medical History:   Diagnosis Date   • Acute bronchospasm 12/20/2021    Bronchospasm   • Anxiety    • Arthritis    • Body mass index (BMI) 37.0-37.9, adult 10/28/2021    Body mass index (BMI) of 37.0 to 37.9 in adult   • COVID-19 05/17/2022    COVID-19 virus infection   • Depression    • Elevated C-reactive protein (CRP)     CRP elevated   • Encounter for follow-up examination after completed treatment for conditions other than malignant neoplasm 01/11/2022    Hospital discharge follow-up   • Fibromyalgia    • Hyperlipidemia    • Hypertension    • Morbid (severe) obesity due to excess calories (Multi) 04/29/2021    Class 2 severe obesity due to excess calories with serious comorbidity and body mass index (BMI) of 37.0 to 37.9 in adult   • Morbid (severe) obesity due to excess calories (Multi) 10/28/2021    Class 2 severe obesity with serious comorbidity and body mass index (BMI) of 37.0 to 37.9 in adult   • Personal history of other diseases of the musculoskeletal system and connective tissue     History of osteopenia   • Personal history of other specified conditions     History of impaired glucose  tolerance   • Personal history of other specified conditions 01/07/2022    History of diarrhea   • Sleep apnea    • Thyroid disease      Past Surgical History:   Procedure Laterality Date   • HYSTERECTOMY     • OTHER SURGICAL HISTORY  02/24/2020    Hysterectomy   • OTHER SURGICAL HISTORY  02/24/2020    Vagotomy   • OTHER SURGICAL HISTORY  02/24/2020    Cholecystectomy   • OTHER SURGICAL HISTORY  02/24/2020    Pyloroplasty     Social History     Tobacco Use   • Smoking status: Never   • Smokeless tobacco: Never   Substance Use Topics   • Alcohol use: Never     family history includes Breast cancer in her mother; Cancer in her brother and mother; Heart disease in her brother and mother; Stroke in her father.    Allergies   Allergen Reactions   • Amlodipine Swelling     Mouth    • Lisinopril Swelling     Mouth   • Carvedilol Hives, Itching and Swelling   • Springer Thyroid [Thyroid (Pork)] Other     Malaise    • Furosemide Rash   • Hydrochlorothiazide Rash   • Metformin Hives and Rash   • Penicillins Hives and Rash   • Spironolactone Rash   • Synthroid [Levothyroxine] Rash       Current Outpatient Medications:   •  ascorbic acid (Vitamin C) 500 mg tablet, Take 1 tablet (500 mg) by mouth once daily., Disp: , Rfl:   •  baclofen (Lioresal) 10 mg tablet, Take 1 tablet (10 mg) by mouth 3 times a day. TAKING PRN, Disp: , Rfl:   •  buPROPion XL (Wellbutrin XL) 300 mg 24 hr tablet, Take 1 tablet (300 mg) by mouth once daily., Disp: 90 tablet, Rfl: 1  •  cyanocobalamin (Vitamin B-12) 1,000 mcg tablet, Take 1 tablet (1,000 mcg) by mouth once daily., Disp: , Rfl:   •  ergocalciferol (Vitamin D-2) 50 MCG (2000 UT) capsule capsule, Take 1 capsule (50 mcg) by mouth once daily., Disp: , Rfl:   •  fish oil concentrate (Omega-3) 120-180 mg capsule, Take 1 capsule (1 g) by mouth once daily., Disp: , Rfl:   •  multivitamin tablet, Take 1 tablet by mouth once daily., Disp: , Rfl:   •  primidone (Mysoline) 50 mg tablet, Take 1 tablet (50 mg)  by mouth 3 times a day., Disp: 270 tablet, Rfl: 1  •  Blood glucose monitoring meter kit (FreeStyle Lite Meter) kit, 1 each once daily. Give what is covered by insurance Patient to test blood sugar daily (Patient not taking: Reported on 12/10/2024), Disp: 1 each, Rfl: 0  •  blood sugar diagnostic (FreeStyle Lite Strips) strip, 1 strip once daily. Give what is covered by insurance (Patient not taking: Reported on 12/10/2024), Disp: 100 strip, Rfl: 11  •  FreeStyle Lancets 28 gauge, Use as instructed give what is covered by insurance (Patient not taking: Reported on 12/10/2024), Disp: 100 each, Rfl: 11    Objective     /82   Pulse 89   Wt 90.7 kg (199 lb 14.4 oz)   BMI 34.31 kg/m²     SLUMS 17/30 on 12/10/24    (+) some head tremor    Awake, alert, oriented x3, in no distress  Well-nourished, ambulatory independently  No leg edema    Mental status exam as above, conversant   Fair fund of knowledge  Recent/remote memory fair  Fair attention span  Pupils round reactive to light, 3-4 mm, (-) RAPD   Fundoscopic examination was attempted but fundus was not visualized bilaterally   Full EOMs intact, no nystagmus, no ptosis   V1 to V3 sensation is intact   No facial droop   Hearing grossly intact   No dysarthria  Good shoulder shrug bilaterally   Tongue is midline     Motor strength is 5/5 on all extremities, tone/bulk normal   Some UE tremors  Reflexes trace on all 4 extremities, downgoing toes bilaterally   Sensation is intact to light touch, vibration on all 4 extremities   Finger to nose test intact bilaterally   Negative Romberg sign   Normal gait       Lab Results   Component Value Date    WBC 4.5 11/18/2024    RBC 4.73 11/18/2024    HGB 15.6 11/18/2024    HCT 46.8 (H) 11/18/2024     11/18/2024     11/18/2024    K 4.1 11/18/2024     11/18/2024    BUN 24 (H) 11/18/2024    CREATININE 0.78 11/18/2024    EGFR 78 11/18/2024    CALCIUM 9.1 11/18/2024    ALKPHOS 67 11/18/2024    AST 20 11/18/2024     ALT 25 11/18/2024    MG 2.19 06/09/2021    RNRPYUQV59 1,304 (H) 11/18/2024    VITD25 73 11/18/2024    HGBA1C 5.7 11/26/2024    LDLCALC 91 11/18/2024    CHOL 175 11/18/2024    HDL 72.5 11/18/2024    TRIG 56 11/18/2024    TSH 3.78 11/18/2024        Assessment/Plan     Mild cognitive impairment with memory loss  Likely MCI  SLUMS 17/30 on 12/10/24  Discussed, also on multiple meds (baclofen, primidone, bupropion) which may be negatively affecting cognition as well due to poss SE    2.   Essential tremor  Stated hx ET  On primidone 50mg tid  Never was on beta blocker--? Why  Discussed poss switch to propranolol, poss SE  Discussed, unfortunately, outside of these two, other medication(s) do not work well for this condition    3. REMBERTO on CPAP  Being seen elsewhere  Says using    Plans:  Do MRI brain wo  ?? Use propranolol    Rtc after testing    All questions were answered.  Pt knows how to contact my office in case pt has any questions or concerns.    Andres Everett MD

## 2024-12-10 NOTE — TELEPHONE ENCOUNTER
Med Refill   primidone (Mysoline) 50 mg tablet [[id number]]       [id number] - [place], [place] - [address]  [address], [facility] [address]  Phone: [phone]  Fax: [phone]  NASIM #: --

## 2025-01-02 DIAGNOSIS — F32.5 MAJOR DEPRESSION IN REMISSION (CMS-HCC): ICD-10-CM

## 2025-01-02 RX ORDER — BUPROPION HYDROCHLORIDE 300 MG/1
300 TABLET ORAL DAILY
Qty: 90 TABLET | Refills: 0 | Status: SHIPPED | OUTPATIENT
Start: 2025-01-02

## 2025-01-03 ENCOUNTER — HOSPITAL ENCOUNTER (OUTPATIENT)
Dept: RADIOLOGY | Facility: HOSPITAL | Age: 80
Discharge: HOME | End: 2025-01-03
Payer: MEDICARE

## 2025-01-03 DIAGNOSIS — G31.84 MCI (MILD COGNITIVE IMPAIRMENT): ICD-10-CM

## 2025-01-03 PROCEDURE — 70551 MRI BRAIN STEM W/O DYE: CPT

## 2025-01-06 ENCOUNTER — APPOINTMENT (OUTPATIENT)
Dept: PRIMARY CARE | Facility: CLINIC | Age: 80
End: 2025-01-06
Payer: MEDICARE

## 2025-01-14 VITALS
WEIGHT: 196 LBS | DIASTOLIC BLOOD PRESSURE: 92 MMHG | HEART RATE: 92 BPM | BODY MASS INDEX: 33.46 KG/M2 | HEIGHT: 64 IN | RESPIRATION RATE: 16 BRPM | OXYGEN SATURATION: 94 % | SYSTOLIC BLOOD PRESSURE: 170 MMHG | TEMPERATURE: 97.9 F

## 2025-01-14 PROCEDURE — 99283 EMERGENCY DEPT VISIT LOW MDM: CPT

## 2025-01-14 ASSESSMENT — PAIN - FUNCTIONAL ASSESSMENT: PAIN_FUNCTIONAL_ASSESSMENT: 0-10

## 2025-01-14 ASSESSMENT — PAIN SCALES - GENERAL: PAINLEVEL_OUTOF10: 0 - NO PAIN

## 2025-01-14 ASSESSMENT — COLUMBIA-SUICIDE SEVERITY RATING SCALE - C-SSRS
6. HAVE YOU EVER DONE ANYTHING, STARTED TO DO ANYTHING, OR PREPARED TO DO ANYTHING TO END YOUR LIFE?: NO
1. IN THE PAST MONTH, HAVE YOU WISHED YOU WERE DEAD OR WISHED YOU COULD GO TO SLEEP AND NOT WAKE UP?: NO
2. HAVE YOU ACTUALLY HAD ANY THOUGHTS OF KILLING YOURSELF?: NO

## 2025-01-15 ENCOUNTER — HOSPITAL ENCOUNTER (EMERGENCY)
Facility: HOSPITAL | Age: 80
Discharge: HOME | End: 2025-01-15
Attending: STUDENT IN AN ORGANIZED HEALTH CARE EDUCATION/TRAINING PROGRAM
Payer: MEDICARE

## 2025-01-15 DIAGNOSIS — R21 RASH: Primary | ICD-10-CM

## 2025-01-15 RX ORDER — PREDNISONE 50 MG/1
50 TABLET ORAL DAILY
Qty: 5 TABLET | Refills: 0 | Status: SHIPPED | OUTPATIENT
Start: 2025-01-15 | End: 2025-01-20

## 2025-01-15 NOTE — ED TRIAGE NOTES
Pt has had L  palm swelling and itching. Today the redness and swelling  started  going up her arm

## 2025-01-15 NOTE — DISCHARGE INSTRUCTIONS
Take medication as prescribed.  Take Benadryl as needed for the itching.  Follow-up with your primary care doctor.  Come back to the ED for any new or worsening symptoms such as development of pain with rash or fevers.

## 2025-01-15 NOTE — ED PROVIDER NOTES
HPI   Chief Complaint   Patient presents with    Rash       79-year-old female presents ED with concerns for a rash to her left hand and wrist.  Symptoms gone for last couple days.  She describes the rash as itchy.  Rash is not anywhere else on her body.  No chest pain or shortness of breath.  No mucosal involvement.  She says she has had this happen once before where she received Benadryl and steroids and got better afterwards.  Denies any new contacts.  Denies pain at the rash.  No fevers.              Patient History   Past Medical History:   Diagnosis Date    Acute bronchospasm 12/20/2021    Bronchospasm    Anxiety     Arthritis     Body mass index (BMI) 37.0-37.9, adult 10/28/2021    Body mass index (BMI) of 37.0 to 37.9 in adult    COVID-19 05/17/2022    COVID-19 virus infection    Depression     Elevated C-reactive protein (CRP)     CRP elevated    Encounter for follow-up examination after completed treatment for conditions other than malignant neoplasm 01/11/2022    Hospital discharge follow-up    Fibromyalgia     Hyperlipidemia     Hypertension     Morbid (severe) obesity due to excess calories (Multi) 04/29/2021    Class 2 severe obesity due to excess calories with serious comorbidity and body mass index (BMI) of 37.0 to 37.9 in adult    Morbid (severe) obesity due to excess calories (Multi) 10/28/2021    Class 2 severe obesity with serious comorbidity and body mass index (BMI) of 37.0 to 37.9 in adult    Personal history of other diseases of the musculoskeletal system and connective tissue     History of osteopenia    Personal history of other specified conditions     History of impaired glucose tolerance    Personal history of other specified conditions 01/07/2022    History of diarrhea    Sleep apnea     Thyroid disease      Past Surgical History:   Procedure Laterality Date    HYSTERECTOMY      OTHER SURGICAL HISTORY  02/24/2020    Hysterectomy    OTHER SURGICAL HISTORY  02/24/2020    Vagotomy    OTHER  SURGICAL HISTORY  02/24/2020    Cholecystectomy    OTHER SURGICAL HISTORY  02/24/2020    Pyloroplasty     Family History   Problem Relation Name Age of Onset    Heart disease Mother      Cancer Mother      Breast cancer Mother      Stroke Father      Heart disease Brother      Cancer Brother       Social History     Tobacco Use    Smoking status: Never    Smokeless tobacco: Never   Vaping Use    Vaping status: Never Used   Substance Use Topics    Alcohol use: Never    Drug use: Never       Physical Exam   ED Triage Vitals [01/14/25 2229]   Temperature Heart Rate Respirations BP   36.6 °C (97.9 °F) 92 16 (!) 170/92      Pulse Ox Temp Source Heart Rate Source Patient Position   94 % Temporal Monitor --      BP Location FiO2 (%)     Left arm --       Physical Exam  Vitals and nursing note reviewed.   Constitutional:       General: She is not in acute distress.     Appearance: She is well-developed.   HENT:      Head: Normocephalic and atraumatic.   Eyes:      Conjunctiva/sclera: Conjunctivae normal.   Cardiovascular:      Rate and Rhythm: Normal rate and regular rhythm.      Heart sounds: No murmur heard.  Pulmonary:      Effort: Pulmonary effort is normal. No respiratory distress.      Breath sounds: Normal breath sounds.   Abdominal:      Palpations: Abdomen is soft.      Tenderness: There is no abdominal tenderness.   Musculoskeletal:         General: No swelling.      Cervical back: Neck supple.      Comments: Macular rash to the left hand and forearm.  Blanching.  No pain.  No blisters.   Skin:     General: Skin is warm and dry.      Capillary Refill: Capillary refill takes less than 2 seconds.   Neurological:      Mental Status: She is alert.   Psychiatric:         Mood and Affect: Mood normal.           ED Course & MDM   Diagnoses as of 01/15/25 0117   Rash                 No data recorded     Marvin Coma Scale Score: 15 (01/14/25 2228 : Milean Liz RN)                           Medical Decision  Making  HISTORIAN:  Patient    CHART REVIEW:  No pertinent findings    PT SUMMARY:  79-year-old female presents to ED with a rash to the left hand.  Vital signs stable.    DDX:  Allergic reaction, dermatitis, cellulitis      DISPO/RE-EVAL:  I suspect the patient's rash is likely due to dermatitis versus allergic reaction.  Lower suspicion for cellulitis as there is no pain to the area and she has had no fever.  Therefore, will discharge patient home with Benadryl and prednisone for her symptoms.  Recommend following up with her primary care doctor.  Advised to come back to the ED for any new or worsening symptoms.          Procedure  Procedures     Iftikhar Alvarez DO  01/15/25 0119

## 2025-02-12 ENCOUNTER — OFFICE VISIT (OUTPATIENT)
Dept: NEUROLOGY | Facility: HOSPITAL | Age: 80
End: 2025-02-12
Payer: MEDICARE

## 2025-02-12 VITALS
WEIGHT: 198.7 LBS | DIASTOLIC BLOOD PRESSURE: 83 MMHG | BODY MASS INDEX: 34.11 KG/M2 | HEART RATE: 80 BPM | SYSTOLIC BLOOD PRESSURE: 153 MMHG

## 2025-02-12 DIAGNOSIS — Z79.899 POLYPHARMACY: ICD-10-CM

## 2025-02-12 DIAGNOSIS — G31.84 MCI (MILD COGNITIVE IMPAIRMENT): Primary | ICD-10-CM

## 2025-02-12 PROCEDURE — 99214 OFFICE O/P EST MOD 30 MIN: CPT | Performed by: PSYCHIATRY & NEUROLOGY

## 2025-02-12 PROCEDURE — 3079F DIAST BP 80-89 MM HG: CPT | Performed by: PSYCHIATRY & NEUROLOGY

## 2025-02-12 PROCEDURE — 3077F SYST BP >= 140 MM HG: CPT | Performed by: PSYCHIATRY & NEUROLOGY

## 2025-02-12 PROCEDURE — 1160F RVW MEDS BY RX/DR IN RCRD: CPT | Performed by: PSYCHIATRY & NEUROLOGY

## 2025-02-12 PROCEDURE — 1159F MED LIST DOCD IN RCRD: CPT | Performed by: PSYCHIATRY & NEUROLOGY

## 2025-02-12 PROCEDURE — 1123F ACP DISCUSS/DSCN MKR DOCD: CPT | Performed by: PSYCHIATRY & NEUROLOGY

## 2025-02-12 PROCEDURE — 1157F ADVNC CARE PLAN IN RCRD: CPT | Performed by: PSYCHIATRY & NEUROLOGY

## 2025-02-12 PROCEDURE — 1125F AMNT PAIN NOTED PAIN PRSNT: CPT | Performed by: PSYCHIATRY & NEUROLOGY

## 2025-02-12 RX ORDER — CALCIUM CARBONATE 500(1250)
TABLET,CHEWABLE ORAL DAILY
COMMUNITY

## 2025-02-12 RX ORDER — VIT C/E/ZN/COPPR/LUTEIN/ZEAXAN 250MG-90MG
25 CAPSULE ORAL DAILY
COMMUNITY

## 2025-02-12 RX ORDER — EVE PRIMROSE/LINOLEIC/G-LENIC 1000 MG
CAPSULE ORAL
COMMUNITY

## 2025-02-12 ASSESSMENT — PAIN SCALES - GENERAL: PAINLEVEL_OUTOF10: 6

## 2025-02-12 ASSESSMENT — ENCOUNTER SYMPTOMS
LOSS OF SENSATION IN FEET: 0
OCCASIONAL FEELINGS OF UNSTEADINESS: 1
DEPRESSION: 0

## 2025-02-12 NOTE — PROGRESS NOTES
Follow-up visit    Visit type: Follow-up    PCP: MACK Ba-CNS.    Subjective     Liane Gates is a 79 y.o. year old female here for follow-up. Last seen 12/10/2024.     Patient is accompanied by spouse.       HPI    I first saw her 12/10/24. Trouble with memory, short term>>long term memory. x2-3 years.  Worsening. Lives with spouse. Cooks. Not leaving stove on or faucet on. Hasn't been driving, due to leg cramping.    Has had COVID in 2022. Has been mostly at home due to pain issues. Doesn't go to occasions/celebrations. Feels it is a huge effort to get out of the house due to her fibromyalgia. No depression. No anxiety.   Has not seen anybody for memory except PCP. No stroke or sz hx. SLUMS 17/30 on 12/10/24. REMBERTO on CPAP. Using without issues. Hba1c 5.8. 11/18/24 B12 ok, TSH ok. Has ET, on primidone. Wants me to comment on that as well. Apparently has never been on beta blocker for ET--? Why.  On primidone 50mg bid  On baclofen 10mg once or bid  On bupropion XL 300mg daily.  No smoking. No alcohol. No street drug use.  During last visit, advised do MRI brain wo, and ? Consider propranolol for ET.    Since last visit, MRI brain done showed mild microangiopathy. Also with T2* abnormality in R temporal lobe.     Here today for follow-up.    Symptoms same. No new medical issues.      Patient Active Problem List   Diagnosis    Essential tremor    Fibromyalgia    Generalized weakness    HTN (hypertension)    Hypothyroidism    Low vitamin D level    Major depression in remission (CMS-[facility])    REMBERTO on CPAP    Pneumonia    Prediabetes    Reactive airway disease ([facility])    Medicare annual wellness visit, subsequent    Hyperglycemia    MCI (mild cognitive impairment)       Allergies   Allergen Reactions    Amlodipine Swelling     Mouth     Lisinopril Swelling     Mouth    Carvedilol Hives, Itching and Swelling    Leslie Thyroid [Thyroid (Pork)] Other     Malaise     Furosemide Rash    Hydrochlorothiazide  Rash    Metformin Hives and Rash    Penicillins Hives and Rash    Spironolactone Rash    Synthroid [Levothyroxine] Rash       Current Outpatient Medications:     ascorbic acid (Vitamin C) 500 mg tablet, Take 1 tablet (500 mg) by mouth once daily., Disp: , Rfl:     aspirin/caffeine (CHERRI BACK AND BODY ORAL), Take by mouth if needed., Disp: , Rfl:     baclofen (Lioresal) 10 mg tablet, Take 1 tablet (10 mg) by mouth 3 times a day. TAKING PRN, Disp: , Rfl:     blood sugar diagnostic (FreeStyle Lite Strips) strip, 1 strip once daily. Give what is covered by insurance (Patient not taking: Reported on 12/10/2024), Disp: 100 strip, Rfl: 11    buPROPion XL (Wellbutrin XL) 300 mg 24 hr tablet, TAKE 1 TABLET BY MOUTH ONCE DAILY, Disp: 90 tablet, Rfl: 0    CHROMIUM ORAL, Take by mouth., Disp: , Rfl:     cyanocobalamin (Vitamin B-12) 1,000 mcg tablet, Take 1 tablet (1,000 mcg) by mouth once daily., Disp: , Rfl:     ergocalciferol (Vitamin D-2) 50 MCG (2000 UT) capsule capsule, Take 1 capsule (50 mcg) by mouth once daily., Disp: , Rfl:     fish oil concentrate (Omega-3) 120-180 mg capsule, Take 1 capsule (1 g) by mouth once daily., Disp: , Rfl:     magnesium oxide (Mag-Ox) 400 mg tablet, once daily., Disp: , Rfl:     multivitamin tablet, Take 1 tablet by mouth once daily., Disp: , Rfl:     primidone (Mysoline) 50 mg tablet, Take 1 tablet (50 mg) by mouth 3 times a day., Disp: 270 tablet, Rfl: 1    vitamin E 450 mg (1000 unit) capsule, Take by mouth once daily., Disp: , Rfl:      Objective     There were no vitals taken for this visit.     SLUMS 17/30 on 12/10/24     (+) some head tremor    Awake, alert, oriented x3, in no distress  Well-nourished, ambulatory independently    Mental status exam as above, conversant   Full EOMs intact, no nystagmus, no ptosis   No facial droop   Hearing grossly intact   No dysarthria    Motor strength is at least antigravity on all extremities  Normal gait      Lab Results   Component Value Date     WBC 4.5 11/18/2024    RBC 4.73 11/18/2024    HGB 15.6 11/18/2024    HCT 46.8 (H) 11/18/2024     11/18/2024     11/18/2024    K 4.1 11/18/2024     11/18/2024    BUN 24 (H) 11/18/2024    CREATININE 0.78 11/18/2024    EGFR 78 11/18/2024    CALCIUM 9.1 11/18/2024    ALKPHOS 67 11/18/2024    AST 20 11/18/2024    ALT 25 11/18/2024    MG 2.19 06/09/2021    WSQWJYGS05 1,304 (H) 11/18/2024    VITD25 73 11/18/2024    HGBA1C 5.7 11/26/2024    LDLCALC 91 11/18/2024    CHOL 175 11/18/2024    HDL 72.5 11/18/2024    TRIG 56 11/18/2024    TSH 3.78 11/18/2024        MR brain wo IV contrast 01/03/2025    Narrative  Interpreted By:  Marco Antonio Hyatt,  and Charis Squires  STUDY:  MR BRAIN WO IV CONTRAST;  1/3/2025 1:42 pm    INDICATION:  Signs/Symptoms:mild cognitive impairment, tremors.    ,G31.84 Mild cognitive impairment of uncertain or unknown etiology    COMPARISON:  None.    ACCESSION NUMBER(S):  [id number]    ORDERING CLINICIAN:  JOSIAH NOVA    TECHNIQUE:  Standard multiplanar multisequence MR imaging was performed through  the brain without administration of intravenous contrast    FINDINGS:  There is no diffusion restriction to suggest acute infarct. Gradient  echo T2 weighted images demonstrate at least 1 focus of abnormally  low signal in the right temporal lobe white matter on axial 18/39  that can not be further characterized but is consistent with amyloid  deposition or previous hemorrhage. There is no midline shift or mass  effect. Mild FLAIR hyperintensities in the subcortical and  periventricular white matter.    Mild parenchymal volume loss with associated ex vacuo dilatation of  the ventricles. No extra-axial fluid collection. Flow voids are  patent.    Visualized paranasal sinuses are clear. Fluid within the bilateral  mastoid air cells.    Impression  There is no evidence of acute hemorrhage, mass lesion or acute  infarction. Mild FLAIR hyperintensity consistent with  microangiopathy. Mild  parenchymal volume loss with associated ex  vacuo dilatation of the ventricles.    I personally reviewed the images/study and I agree with the findings  as stated by Shaw Vizcaino MD, PGY-2 this study was interpreted at  [facility], [place], [place].    MACRO:  None    Signed by: Marco Antonio Hyatt 1/3/2025 4:05 PM  Dictation workstation:   [id number]      Assessment/Plan     Mild cognitive impairment with memory loss  Likely MCI  SLUMS 17/30 on 12/10/24  1/2025 MRI brain with mild FLAIR hyperintensities in subcortical and PV WM, mild parenchymal vol loss with associated ex-vacuo dilatation of ventricles--images reviewed, discussed     2.   Essential tremor  Stated hx ET  On primidone 50mg tid  Never was on beta blocker--? Why  Previously discussed poss switch to propranolol, poss SE     3. Polypharmacy  On multiple meds (baclofen, primidone, bupropion) which may be negatively affecting cognition as well due to poss SE    4. REMBERTO on CPAP  Being seen elsewhere  Says using       Plans:  Pt to discuss with PCP about ? Taking ASA 81mg daily  Control risk factors  Discussed poss use of memory pills, realistic expectations (memantine, donepezil), poss SE--pt/spouse wants to read more then decide    Rtc as needed pending above    All questions were answered.  Pt knows how to contact my office in case pt has any questions or concerns.    Andres Everett MD

## 2025-02-12 NOTE — PATIENT INSTRUCTIONS
Pt to discuss with PCP about ? Taking ASA 81mg daily  Control risk factors  Discussed poss use of memory pills, realistic expectations (memantine, donepezil), poss SE--pt/spouse wants to read more then decide    Rtc as needed pending above

## 2025-02-12 NOTE — LETTER
February 13, 2025     MACK Ba-SHANNA  [address] [facility] [address]  Counts include 234 beds at the [facility] [address]    Patient: Liane Gates   YOB: 1945   Date of Visit: 2/12/2025       Dear MACK García-SHANNA:    Thank you for referring Liane Gates to me for evaluation. Below are my notes for this consultation.  If you have questions, please do not hesitate to call me. I look forward to following your patient along with you.       Sincerely,     Andres Everett MD      CC: No Recipients  ______________________________________________________________________________________    Follow-up visit    Visit type: Follow-up    PCP: ROBERT Ba.    Subjective    Liane Gates is a 79 y.o. year old female here for follow-up. Last seen 12/10/2024.     Patient is accompanied by spouse.       HPI    I first saw her 12/10/24. Trouble with memory, short term>>long term memory. x2-3 years.  Worsening. Lives with spouse. Cooks. Not leaving stove on or faucet on. Hasn't been driving, due to leg cramping.    Has had COVID in 2022. Has been mostly at home due to pain issues. Doesn't go to occasions/celebrations. Feels it is a huge effort to get out of the house due to her fibromyalgia. No depression. No anxiety.   Has not seen anybody for memory except PCP. No stroke or sz hx. SLUMS 17/30 on 12/10/24. REMBERTO on CPAP. Using without issues. Hba1c 5.8. 11/18/24 B12 ok, TSH ok. Has ET, on primidone. Wants me to comment on that as well. Apparently has never been on beta blocker for ET--? Why.  On primidone 50mg bid  On baclofen 10mg once or bid  On bupropion XL 300mg daily.  No smoking. No alcohol. No street drug use.  During last visit, advised do MRI brain wo, and ? Consider propranolol for ET.    Since last visit, MRI brain done showed mild microangiopathy. Also with T2* abnormality in R temporal lobe.     Here today for follow-up.    Symptoms same. No new medical  issues.      Patient Active Problem List   Diagnosis   • Essential tremor   • Fibromyalgia   • Generalized weakness   • HTN (hypertension)   • Hypothyroidism   • Low vitamin D level   • Major depression in remission (CMS-[facility])   • REMBERTO on CPAP   • Pneumonia   • Prediabetes   • Reactive airway disease ([facility])   • Medicare annual wellness visit, subsequent   • Hyperglycemia   • MCI (mild cognitive impairment)       Allergies   Allergen Reactions   • Amlodipine Swelling     Mouth    • Lisinopril Swelling     Mouth   • Carvedilol Hives, Itching and Swelling   • [place] Thyroid [Thyroid (Pork)] Other     Malaise    • Furosemide Rash   • Hydrochlorothiazide Rash   • Metformin Hives and Rash   • Penicillins Hives and Rash   • Spironolactone Rash   • Synthroid [Levothyroxine] Rash       Current Outpatient Medications:   •  ascorbic acid (Vitamin C) 500 mg tablet, Take 1 tablet (500 mg) by mouth once daily., Disp: , Rfl:   •  aspirin/caffeine (CHERRI BACK AND BODY ORAL), Take by mouth if needed., Disp: , Rfl:   •  baclofen (Lioresal) 10 mg tablet, Take 1 tablet (10 mg) by mouth 3 times a day. TAKING PRN, Disp: , Rfl:   •  blood sugar diagnostic (FreeStyle Lite Strips) strip, 1 strip once daily. Give what is covered by insurance (Patient not taking: Reported on 12/10/2024), Disp: 100 strip, Rfl: 11  •  buPROPion XL (Wellbutrin XL) 300 mg 24 hr tablet, TAKE 1 TABLET BY MOUTH ONCE DAILY, Disp: 90 tablet, Rfl: 0  •  CHROMIUM ORAL, Take by mouth., Disp: , Rfl:   •  cyanocobalamin (Vitamin B-12) 1,000 mcg tablet, Take 1 tablet (1,000 mcg) by mouth once daily., Disp: , Rfl:   •  ergocalciferol (Vitamin D-2) 50 MCG (2000 UT) capsule capsule, Take 1 capsule (50 mcg) by mouth once daily., Disp: , Rfl:   •  fish oil concentrate (Omega-3) 120-180 mg capsule, Take 1 capsule (1 g) by mouth once daily., Disp: , Rfl:   •  magnesium oxide (Mag-Ox) 400 mg tablet, once daily., Disp: , Rfl:   •  multivitamin tablet, Take 1 tablet by mouth once  daily., Disp: , Rfl:   •  primidone (Mysoline) 50 mg tablet, Take 1 tablet (50 mg) by mouth 3 times a day., Disp: 270 tablet, Rfl: 1  •  vitamin E 450 mg (1000 unit) capsule, Take by mouth once daily., Disp: , Rfl:      Objective    There were no vitals taken for this visit.     SLUMS 17/30 on 12/10/24     (+) some head tremor    Awake, alert, oriented x3, in no distress  Well-nourished, ambulatory independently    Mental status exam as above, conversant   Full EOMs intact, no nystagmus, no ptosis   No facial droop   Hearing grossly intact   No dysarthria    Motor strength is at least antigravity on all extremities  Normal gait      Lab Results   Component Value Date    WBC 4.5 11/18/2024    RBC 4.73 11/18/2024    HGB 15.6 11/18/2024    HCT 46.8 (H) 11/18/2024     11/18/2024     11/18/2024    K 4.1 11/18/2024     11/18/2024    BUN 24 (H) 11/18/2024    CREATININE 0.78 11/18/2024    EGFR 78 11/18/2024    CALCIUM 9.1 11/18/2024    ALKPHOS 67 11/18/2024    AST 20 11/18/2024    ALT 25 11/18/2024    MG 2.19 06/09/2021    [id number] 1,304 (H) 11/18/2024    VITD25 73 11/18/2024    HGBA1C 5.7 11/26/2024    LDLCALC 91 11/18/2024    CHOL 175 11/18/2024    HDL 72.5 11/18/2024    TRIG 56 11/18/2024    TSH 3.78 11/18/2024        MR brain wo IV contrast 01/03/2025    Narrative  Interpreted By:  Marco Antonio Hyatt,  and Charis Squires  STUDY:  MR BRAIN WO IV CONTRAST;  1/3/2025 1:42 pm    INDICATION:  Signs/Symptoms:mild cognitive impairment, tremors.    ,G31.84 Mild cognitive impairment of uncertain or unknown etiology    COMPARISON:  None.    ACCESSION NUMBER(S):  [id number]    ORDERING CLINICIAN:  JOSIAH NOVA    TECHNIQUE:  Standard multiplanar multisequence MR imaging was performed through  the brain without administration of intravenous contrast    FINDINGS:  There is no diffusion restriction to suggest acute infarct. Gradient  echo T2 weighted images demonstrate at least 1 focus of abnormally  low signal in  the right temporal lobe white matter on axial 18/39  that can not be further characterized but is consistent with amyloid  deposition or previous hemorrhage. There is no midline shift or mass  effect. Mild FLAIR hyperintensities in the subcortical and  periventricular white matter.    Mild parenchymal volume loss with associated ex vacuo dilatation of  the ventricles. No extra-axial fluid collection. Flow voids are  patent.    Visualized paranasal sinuses are clear. Fluid within the bilateral  mastoid air cells.    Impression  There is no evidence of acute hemorrhage, mass lesion or acute  infarction. Mild FLAIR hyperintensity consistent with  microangiopathy. Mild parenchymal volume loss with associated ex  vacuo dilatation of the ventricles.    I personally reviewed the images/study and I agree with the findings  as stated by Shaw Vizcaino MD, PGY-2 this study was interpreted at  [facility], [place], [place].    MACRO:  None    Signed by: Marco Antonio Hyatt 1/3/2025 4:05 PM  Dictation workstation:   [id number]      Assessment/Plan    Mild cognitive impairment with memory loss  Likely MCI  SLUMS 17/30 on 12/10/24  1/2025 MRI brain with mild FLAIR hyperintensities in subcortical and PV WM, mild parenchymal vol loss with associated ex-vacuo dilatation of ventricles--images reviewed, discussed     2.   Essential tremor  Stated hx ET  On primidone 50mg tid  Never was on beta blocker--? Why  Previously discussed poss switch to propranolol, poss SE     3. Polypharmacy  On multiple meds (baclofen, primidone, bupropion) which may be negatively affecting cognition as well due to poss SE    4. REMBERTO on CPAP  Being seen elsewhere  Says using       Plans:  Pt to discuss with PCP about ? Taking ASA 81mg daily  Control risk factors  Discussed poss use of memory pills, realistic expectations (memantine, donepezil), poss SE--pt/spouse wants to read more then decide    Rtc as needed pending  above    All questions were answered.  Pt knows how to contact my office in case pt has any questions or concerns.    Andres Everett MD

## 2025-04-10 ENCOUNTER — TELEPHONE (OUTPATIENT)
Dept: PRIMARY CARE | Facility: CLINIC | Age: 80
End: 2025-04-10
Payer: MEDICARE

## 2025-04-10 DIAGNOSIS — F32.5 MAJOR DEPRESSION IN REMISSION (CMS-HCC): ICD-10-CM

## 2025-04-10 RX ORDER — BUPROPION HYDROCHLORIDE 300 MG/1
300 TABLET ORAL DAILY
Qty: 90 TABLET | Refills: 0 | Status: SHIPPED | OUTPATIENT
Start: 2025-04-10

## 2025-04-10 NOTE — TELEPHONE ENCOUNTER
buPROPion XL (Wellbutrin XL) 300 mg 24 hr tablet [[id number]]    Order Details  Dose: 300 mg Route: oral Frequency: Daily   Dispense Quantity: 90 tablet Refills: 0          Sig: TAKE 1 TABLET BY MOUTH ONCE DAILY         Start Date: 01/02/25 End Date: --   Written Date: 01/02/25 Rx Expiration Date: 01/02/26        Associated Diagnoses: Major depression in remission (CMS-HCC) [F32.5]   Original Order: buPROPion XL (Wellbutrin XL) 300 mg 24 hr tablet [[id number]]   Pharmacy    [id number] [address]   [facility] 5/27

## 2025-05-20 ENCOUNTER — TELEPHONE (OUTPATIENT)
Dept: NEUROLOGY | Facility: HOSPITAL | Age: 80
End: 2025-05-20
Payer: MEDICARE

## 2025-05-20 NOTE — TELEPHONE ENCOUNTER
When she had her last appointment with you, you had mentioned two medications to her for her to consider. She has decided she would like to try one of them. She would like the names of the meds again please.   There is no rush on your on this information.     Thank you,

## 2025-05-23 LAB
25(OH)D3+25(OH)D2 SERPL-MCNC: 63 NG/ML (ref 30–100)
ALBUMIN SERPL-MCNC: 4.5 G/DL (ref 3.6–5.1)
ALP SERPL-CCNC: 71 U/L (ref 37–153)
ALT SERPL-CCNC: 28 U/L (ref 6–29)
ANION GAP SERPL CALCULATED.4IONS-SCNC: 11 MMOL/L (CALC) (ref 7–17)
AST SERPL-CCNC: 23 U/L (ref 10–35)
BILIRUB SERPL-MCNC: 0.5 MG/DL (ref 0.2–1.2)
BUN SERPL-MCNC: 25 MG/DL (ref 7–25)
CALCIUM SERPL-MCNC: 9.7 MG/DL (ref 8.6–10.4)
CHLORIDE SERPL-SCNC: 98 MMOL/L (ref 98–110)
CHOLEST SERPL-MCNC: 179 MG/DL
CHOLEST/HDLC SERPL: 2.2 (CALC)
CO2 SERPL-SCNC: 29 MMOL/L (ref 20–32)
CREAT SERPL-MCNC: 0.82 MG/DL (ref 0.6–1)
EGFRCR SERPLBLD CKD-EPI 2021: 74 ML/MIN/1.73M2
ERYTHROCYTE [DISTWIDTH] IN BLOOD BY AUTOMATED COUNT: 12.9 % (ref 11–15)
EST. AVERAGE GLUCOSE BLD GHB EST-MCNC: 123 MG/DL
EST. AVERAGE GLUCOSE BLD GHB EST-SCNC: 6.8 MMOL/L
GLUCOSE SERPL-MCNC: 129 MG/DL (ref 65–99)
HBA1C MFR BLD: 5.9 %
HCT VFR BLD AUTO: 47.5 % (ref 35–45)
HDLC SERPL-MCNC: 81 MG/DL
HGB BLD-MCNC: 16.1 G/DL (ref 11.7–15.5)
LDLC SERPL CALC-MCNC: 84 MG/DL (CALC)
MCH RBC QN AUTO: 33.6 PG (ref 27–33)
MCHC RBC AUTO-ENTMCNC: 33.9 G/DL (ref 32–36)
MCV RBC AUTO: 99.2 FL (ref 80–100)
NONHDLC SERPL-MCNC: 98 MG/DL (CALC)
PLATELET # BLD AUTO: 258 THOUSAND/UL (ref 140–400)
PMV BLD REES-ECKER: 10.8 FL (ref 7.5–12.5)
POTASSIUM SERPL-SCNC: 4.3 MMOL/L (ref 3.5–5.3)
PROT SERPL-MCNC: 6.8 G/DL (ref 6.1–8.1)
RBC # BLD AUTO: 4.79 MILLION/UL (ref 3.8–5.1)
SODIUM SERPL-SCNC: 138 MMOL/L (ref 135–146)
TRIGL SERPL-MCNC: 48 MG/DL
TSH SERPL-ACNC: 4.25 MIU/L (ref 0.4–4.5)
WBC # BLD AUTO: 5 THOUSAND/UL (ref 3.8–10.8)

## 2025-05-27 ENCOUNTER — APPOINTMENT (OUTPATIENT)
Dept: PRIMARY CARE | Facility: CLINIC | Age: 80
End: 2025-05-27
Payer: MEDICARE

## 2025-05-27 VITALS
HEART RATE: 83 BPM | SYSTOLIC BLOOD PRESSURE: 120 MMHG | WEIGHT: 195 LBS | DIASTOLIC BLOOD PRESSURE: 80 MMHG | BODY MASS INDEX: 33.29 KG/M2 | HEIGHT: 64 IN

## 2025-05-27 DIAGNOSIS — M19.90 OSTEOARTHRITIS, UNSPECIFIED OSTEOARTHRITIS TYPE, UNSPECIFIED SITE: Primary | ICD-10-CM

## 2025-05-27 DIAGNOSIS — E03.9 ACQUIRED HYPOTHYROIDISM: ICD-10-CM

## 2025-05-27 DIAGNOSIS — J45.909 REACTIVE AIRWAY DISEASE WITHOUT COMPLICATION, UNSPECIFIED ASTHMA SEVERITY, UNSPECIFIED WHETHER PERSISTENT (HHS-HCC): ICD-10-CM

## 2025-05-27 DIAGNOSIS — R73.03 PREDIABETES: ICD-10-CM

## 2025-05-27 DIAGNOSIS — G31.84 MCI (MILD COGNITIVE IMPAIRMENT): ICD-10-CM

## 2025-05-27 DIAGNOSIS — I10 PRIMARY HYPERTENSION: ICD-10-CM

## 2025-05-27 DIAGNOSIS — F32.5 MAJOR DEPRESSION IN REMISSION: ICD-10-CM

## 2025-05-27 DIAGNOSIS — G47.33 OSA ON CPAP: ICD-10-CM

## 2025-05-27 PROCEDURE — 3074F SYST BP LT 130 MM HG: CPT | Performed by: CLINICAL NURSE SPECIALIST

## 2025-05-27 PROCEDURE — G2211 COMPLEX E/M VISIT ADD ON: HCPCS | Performed by: CLINICAL NURSE SPECIALIST

## 2025-05-27 PROCEDURE — 99214 OFFICE O/P EST MOD 30 MIN: CPT | Performed by: CLINICAL NURSE SPECIALIST

## 2025-05-27 PROCEDURE — 3079F DIAST BP 80-89 MM HG: CPT | Performed by: CLINICAL NURSE SPECIALIST

## 2025-05-27 PROCEDURE — 1160F RVW MEDS BY RX/DR IN RCRD: CPT | Performed by: CLINICAL NURSE SPECIALIST

## 2025-05-27 PROCEDURE — 1159F MED LIST DOCD IN RCRD: CPT | Performed by: CLINICAL NURSE SPECIALIST

## 2025-05-27 PROCEDURE — 1036F TOBACCO NON-USER: CPT | Performed by: CLINICAL NURSE SPECIALIST

## 2025-05-27 RX ORDER — DULOXETIN HYDROCHLORIDE 30 MG/1
30 CAPSULE, DELAYED RELEASE ORAL DAILY
Qty: 30 CAPSULE | Refills: 1 | Status: SHIPPED | OUTPATIENT
Start: 2025-05-27 | End: 2025-07-26

## 2025-05-27 RX ORDER — BUPROPION HYDROCHLORIDE 150 MG/1
150 TABLET ORAL EVERY MORNING
Qty: 30 TABLET | Refills: 1 | Status: SHIPPED | OUTPATIENT
Start: 2025-05-27 | End: 2025-07-26

## 2025-05-27 ASSESSMENT — ENCOUNTER SYMPTOMS
CONSTIPATION: 0
FATIGUE: 0
FEVER: 0
SORE THROAT: 0
DIZZINESS: 0
PHOTOPHOBIA: 0
SLEEP DISTURBANCE: 0
PALPITATIONS: 0
DYSURIA: 0
DIARRHEA: 0
POLYDIPSIA: 0
BRUISES/BLEEDS EASILY: 0
VOMITING: 0
TROUBLE SWALLOWING: 0
CHILLS: 0
BLOOD IN STOOL: 0
UNEXPECTED WEIGHT CHANGE: 0
ACTIVITY CHANGE: 0
ARTHRALGIAS: 1
WOUND: 0
BACK PAIN: 1
MYALGIAS: 0
HEADACHES: 0
EYE PAIN: 0
CHEST TIGHTNESS: 0
HEMATURIA: 0
OCCASIONAL FEELINGS OF UNSTEADINESS: 0
WHEEZING: 0
SHORTNESS OF BREATH: 0
JOINT SWELLING: 0
DEPRESSION: 0
LOSS OF SENSATION IN FEET: 0
COUGH: 0
ABDOMINAL PAIN: 0
SEIZURES: 0
APPETITE CHANGE: 0
CONFUSION: 1
FLANK PAIN: 0
NAUSEA: 0
NECK PAIN: 0

## 2025-05-27 ASSESSMENT — COLUMBIA-SUICIDE SEVERITY RATING SCALE - C-SSRS
1. IN THE PAST MONTH, HAVE YOU WISHED YOU WERE DEAD OR WISHED YOU COULD GO TO SLEEP AND NOT WAKE UP?: NO
6. HAVE YOU EVER DONE ANYTHING, STARTED TO DO ANYTHING, OR PREPARED TO DO ANYTHING TO END YOUR LIFE?: NO
2. HAVE YOU ACTUALLY HAD ANY THOUGHTS OF KILLING YOURSELF?: NO

## 2025-05-27 ASSESSMENT — PATIENT HEALTH QUESTIONNAIRE - PHQ9
SUM OF ALL RESPONSES TO PHQ9 QUESTIONS 1 AND 2: 0
2. FEELING DOWN, DEPRESSED OR HOPELESS: NOT AT ALL
1. LITTLE INTEREST OR PLEASURE IN DOING THINGS: NOT AT ALL

## 2025-05-27 NOTE — PROGRESS NOTES
Subjective   Patient ID: Liane Gates is a 79 y.o. female who presents for Follow-up (6 month follow up labs).  HPI    Here today as a follow up appointment.      She does have a history of reactive airway disease and has had recurrent bronchospasm after pulmonary infections in the past. Previously responded well to steroid inhaler, she has stopped taking it daily secondary to cost. She does have one on hand in case she gets an illness currently not having any shortness of breath or wheezing.      She does have history of depression but notes that symptoms have been well controlled with Wellbutrin. Interested in trying Cymbalta to assist with Pain Control.      Essential tremor symptoms have been controlled with medication. She does not feel that she needs to increase or change medication. Now following with Neurology.      Essential hypertension. Does not tolerate Spironolactone. Developed a rash on medication. Stopped medication and symptoms have started to resolve. Multiple medication allergies for blood pressure medications. Previously was on Hydrochlorothiazide, no longer tolerated and again stopped medication. Has been wearing her compression stockings.      Trouble tolerating her thyroid medication. Has tried Levothyroxine, Synthroid, Olmito Thyroid. Developed side effects. Attempted to restart and symptoms returned. Now following with Endo for management but states that she is not getting consistent feedback and ultimately stopped medication.      Complaints of increased neck pain and discomfort. Has been doing at home exercises to try and find relief. Has been using OTC pain relievers and muscle relaxers. Tried Baclofen, Ibuprofen, ASA, Acetaminophen, Hot Packs, Diclofenac, TENS. Considering following with Pain Management. Interested in seeing a Chiropractor.       feels that Memory has been worsening over the past several years, noted at last OV. Worsened after COVID. Safe at home with  . Worse with her Short Term Memory. SLUMS completed at last visit. Followed with Neurology, imaging completed.     Review of Systems   Constitutional:  Negative for activity change, appetite change, chills, fatigue, fever and unexpected weight change.   HENT:  Negative for ear pain, hearing loss, nosebleeds, sore throat, tinnitus and trouble swallowing.    Eyes:  Negative for photophobia, pain and visual disturbance.   Respiratory:  Negative for cough, chest tightness, shortness of breath and wheezing.    Cardiovascular:  Negative for chest pain, palpitations and leg swelling.   Gastrointestinal:  Negative for abdominal pain, blood in stool, constipation, diarrhea, nausea and vomiting.   Endocrine: Negative for cold intolerance, heat intolerance, polydipsia and polyuria.   Genitourinary:  Negative for dysuria, flank pain and hematuria.   Musculoskeletal:  Positive for arthralgias and back pain. Negative for joint swelling, myalgias and neck pain.   Skin:  Negative for pallor, rash and wound.   Allergic/Immunologic: Negative for immunocompromised state.   Neurological:  Negative for dizziness, seizures and headaches.   Hematological:  Does not bruise/bleed easily.   Psychiatric/Behavioral:  Positive for confusion. Negative for sleep disturbance.        Objective   Physical Exam  Vitals and nursing note reviewed.   Constitutional:       General: She is not in acute distress.     Appearance: Normal appearance.   HENT:      Head: Normocephalic.      Nose: Nose normal.   Eyes:      Conjunctiva/sclera: Conjunctivae normal.   Neck:      Vascular: No carotid bruit.   Cardiovascular:      Rate and Rhythm: Normal rate and regular rhythm.      Pulses: Normal pulses.      Heart sounds: Normal heart sounds.   Pulmonary:      Effort: Pulmonary effort is normal.      Breath sounds: Normal breath sounds.   Abdominal:      General: Bowel sounds are normal.      Palpations: Abdomen is soft.   Musculoskeletal:         General:  Normal range of motion.      Cervical back: Normal range of motion.   Skin:     General: Skin is warm and dry.   Neurological:      Mental Status: She is alert and oriented to person, place, and time. Mental status is at baseline.   Psychiatric:         Mood and Affect: Mood normal.         Behavior: Behavior normal.         Assessment/Plan           Reviewed most recent lab work available with patient.      Obstructive sleep apnea. Last CPAP titration study was done June 2019. Patient doing well on CPAP at night with pressure of 11. Considering following with Sleep Medicine for additional recommendations. Considering Inspire based on struggling with her current CPAP.  Hypothyroidism. Monitoring with Endocrinology. Medication adjustments. Difficulty tolerating and stopped medication.   Prediabetes. A1C 5.9%. Encouraged to be more consistent with diet. Previously did not tolerate metformin secondary to side effects. We'll continue to monitor hemoglobin A1c in 6 months. Supplies ordered to monitor Glucose.   OA: Continue pain control OTC. Chiropractor referral. Cymbalta as prescribed.   Reactive airway disease. She has had problems with recurrent bronchospasm after pulmonary infections. Responded previously to Asmanex inhaler but stopped taking daily secondary to cost currently asymptomatic. pulmonary function test in May 2019 was normal.  Obesity: BMI: 33.47. Lifestyle changes recommended: Diet consisting of low fat foods, lean meats, high fiber, fresh fruits and vegetables. 150 min/ weekly aerobic exercise.  Essential tremor. Symptomatically controlled with primidone.  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 Kami Monk to assist with Pain control and adjust Wellbutrin.   Hypertension. Does not tolerate multiple Hypertension medications. Discontinued Spironolactone and Furosemide. Restarted HCTZ, did not tolerate. Screening coronary artery calcium score was Ordered for 2024,  September.  Blood pressure uncontrolled at OV today. Pharmacy referral for additional recommendations provided previously.   MCI: Followed with Neurology for evaluation.      DEXA: August 2024, Osteopenia.   Mammogram: November 2024, negative.  Colonoscopy: February 2024, plan to repeat in 5 years.   Prevnar done November 2017.  Flu Vaccine: November 2024.   Medicare Wellness: July 2024.   COVID Vaccine: June 2021. Discussed Booster.  Discussed updated immunizations.     Floresita Tellez, MACK-CNS 05/27/25 2:47 PM

## 2025-07-08 ENCOUNTER — TELEPHONE (OUTPATIENT)
Dept: PRIMARY CARE | Facility: CLINIC | Age: 80
End: 2025-07-08
Payer: MEDICARE

## 2025-07-08 DIAGNOSIS — F32.5 MAJOR DEPRESSION IN REMISSION: ICD-10-CM

## 2025-07-08 RX ORDER — BUPROPION HYDROCHLORIDE 300 MG/1
300 TABLET ORAL EVERY MORNING
Qty: 30 TABLET | Refills: 1 | Status: SHIPPED | OUTPATIENT
Start: 2025-07-08 | End: 2025-09-06

## 2025-07-08 NOTE — TELEPHONE ENCOUNTER
She called to let us know that she has been taking the Duloxetine 30 mg it is not doing to well for her cause it is making her trimmers are worst now and she somme days takes two naps cause of beingg sleepy. The Buprorion 150 mg is doing good for her and would like to up the MG on this and stop taking the Duloxetine. Please advise and call her at [phone]

## 2025-08-27 ENCOUNTER — APPOINTMENT (OUTPATIENT)
Dept: PRIMARY CARE | Facility: CLINIC | Age: 80
End: 2025-08-27
Payer: MEDICARE

## 2025-09-02 ENCOUNTER — APPOINTMENT (OUTPATIENT)
Dept: PRIMARY CARE | Facility: CLINIC | Age: 80
End: 2025-09-02
Payer: MEDICARE

## 2026-03-17 ENCOUNTER — APPOINTMENT (OUTPATIENT)
Dept: PRIMARY CARE | Facility: CLINIC | Age: 81
End: 2026-03-17
Payer: MEDICARE